# Patient Record
Sex: MALE | Race: WHITE | Employment: OTHER | URBAN - METROPOLITAN AREA
[De-identification: names, ages, dates, MRNs, and addresses within clinical notes are randomized per-mention and may not be internally consistent; named-entity substitution may affect disease eponyms.]

---

## 2023-02-07 DIAGNOSIS — C79.89 SQUAMOUS CELL CARCINOMA METASTATIC TO HEAD AND NECK WITH UNKNOWN PRIMARY SITE (HCC): Primary | ICD-10-CM

## 2023-02-07 DIAGNOSIS — C80.1 SQUAMOUS CELL CARCINOMA METASTATIC TO HEAD AND NECK WITH UNKNOWN PRIMARY SITE (HCC): Primary | ICD-10-CM

## 2023-02-14 ENCOUNTER — HOSPITAL ENCOUNTER (OUTPATIENT)
Dept: PET IMAGING | Age: 68
Discharge: HOME | End: 2023-02-17
Payer: MEDICARE

## 2023-02-14 DIAGNOSIS — C79.89 SQUAMOUS CELL CARCINOMA METASTATIC TO HEAD AND NECK WITH UNKNOWN PRIMARY SITE (HCC): ICD-10-CM

## 2023-02-14 DIAGNOSIS — C80.1 SQUAMOUS CELL CARCINOMA METASTATIC TO HEAD AND NECK WITH UNKNOWN PRIMARY SITE (HCC): ICD-10-CM

## 2023-02-14 LAB
GLUCOSE BLD STRIP.AUTO-MCNC: 105 MG/DL (ref 65–100)
SERVICE CMNT-IMP: ABNORMAL

## 2023-02-14 PROCEDURE — A9552 F18 FDG: HCPCS | Performed by: PHYSICIAN ASSISTANT

## 2023-02-14 PROCEDURE — 78815 PET IMAGE W/CT SKULL-THIGH: CPT

## 2023-02-14 PROCEDURE — 82962 GLUCOSE BLOOD TEST: CPT

## 2023-02-14 PROCEDURE — 2580000003 HC RX 258: Performed by: PHYSICIAN ASSISTANT

## 2023-02-14 PROCEDURE — 3430000000 HC RX DIAGNOSTIC RADIOPHARMACEUTICAL: Performed by: PHYSICIAN ASSISTANT

## 2023-02-14 PROCEDURE — 6360000004 HC RX CONTRAST MEDICATION: Performed by: PHYSICIAN ASSISTANT

## 2023-02-14 RX ORDER — FLUDEOXYGLUCOSE F 18 200 MCI/ML
11.83 INJECTION, SOLUTION INTRAVENOUS
Status: COMPLETED | OUTPATIENT
Start: 2023-02-14 | End: 2023-02-14

## 2023-02-14 RX ORDER — CHLORAL HYDRATE 500 MG
CAPSULE ORAL 2 TIMES DAILY
COMMUNITY

## 2023-02-14 RX ORDER — ATORVASTATIN CALCIUM 10 MG/1
10 TABLET, FILM COATED ORAL NIGHTLY
COMMUNITY

## 2023-02-14 RX ORDER — SODIUM CHLORIDE 0.9 % (FLUSH) 0.9 %
10 SYRINGE (ML) INJECTION ONCE AS NEEDED
Status: COMPLETED | OUTPATIENT
Start: 2023-02-14 | End: 2023-02-14

## 2023-02-14 RX ADMIN — FLUDEOXYGLUCOSE F 18 11.83 MILLICURIE: 200 INJECTION, SOLUTION INTRAVENOUS at 11:16

## 2023-02-14 RX ADMIN — DIATRIZOATE MEGLUMINE AND DIATRIZOATE SODIUM 10 ML: 660; 100 LIQUID ORAL; RECTAL at 11:16

## 2023-02-14 RX ADMIN — SODIUM CHLORIDE, PRESERVATIVE FREE 10 ML: 5 INJECTION INTRAVENOUS at 11:16

## 2023-02-14 NOTE — PRE-PROCEDURE INSTRUCTIONS
Patient verified name and . Order for consent was not found in EHR ; patient verifies procedure. Type III surgery, phone assessment complete. Orders were not received. Labs per surgeon: none received. Labs per anesthesia protocol: CBC, BMP, EKG done as outpatient, Type and Screen DOS. Patient stated he would come in to do outpatient labs on 2/15. Patient given address [address], [address] and hours of operation. Patient verbalized understanding. Attempted to obtain EKG from primary care doctor done 2022 and unable to talk with anyone in office, directed to message line. Chart  done. Patient answered medical/surgical history questions at their best of ability. All prior to admission medications documented in [facility]. Patient instructed to take the following medications the day of surgery according to anesthesia guidelines with a small sip of water: none On the day before surgery please take Acetaminophen 1000mg in the morning and then again before bed. You may substitute for Tylenol 650 mg. Hold all vitamins 7 days prior to surgery and NSAIDS 5 days prior to surgery. Prescription meds to hold:Hydrochlorothiazide, Potassium Citrate. Patient stopped Aspirin @2023  Patient instructed on the following:    > Arrive at [facility], time of arrival to be called the day before by 1700  > NPO after midnight, unless otherwise indicated, including gum, mints, and ice chips  > Responsible adult must drive patient to the hospital, stay during surgery, and patient will need supervision 24 hours after anesthesia  > Use antibacterial soap in shower the night before surgery and on the morning of surgery  > All piercings must be removed prior to arrival.    > Leave all valuables (money and jewelry) at home but bring insurance card and ID on DOS.   > You may be required to pay a deductible or co-pay on the day of your procedure.  You can pre-pay by calling [phone] if your surgery is at the [address] or [phone] if your surgery is at the [facility]. > Do not wear make-up, nail polish, lotions, cologne, perfumes, powders, or oil on skin. Artificial nails are not permitted.

## 2023-02-15 ENCOUNTER — HOSPITAL ENCOUNTER (OUTPATIENT)
Dept: PREADMISSION TESTING | Age: 68
Discharge: HOME | End: 2023-02-18
Payer: MEDICARE

## 2023-02-15 DIAGNOSIS — Z01.818 PREOP TESTING: ICD-10-CM

## 2023-02-15 LAB
ANION GAP SERPL CALC-SCNC: 3 MMOL/L (ref 2–11)
BUN SERPL-MCNC: 16 MG/DL (ref 8–23)
CALCIUM SERPL-MCNC: 9.2 MG/DL (ref 8.3–10.4)
CHLORIDE SERPL-SCNC: 110 MMOL/L (ref 101–110)
CO2 SERPL-SCNC: 29 MMOL/L (ref 21–32)
CREAT SERPL-MCNC: 1 MG/DL (ref 0.8–1.5)
EKG ATRIAL RATE: 54 BPM
EKG DIAGNOSIS: NORMAL
EKG P AXIS: 14 DEGREES
EKG P-R INTERVAL: 170 MS
EKG Q-T INTERVAL: 414 MS
EKG QRS DURATION: 92 MS
EKG QTC CALCULATION (BAZETT): 392 MS
EKG R AXIS: -31 DEGREES
EKG T AXIS: -8 DEGREES
EKG VENTRICULAR RATE: 54 BPM
ERYTHROCYTE [DISTWIDTH] IN BLOOD BY AUTOMATED COUNT: 14.1 % (ref 11.9–14.6)
GLUCOSE SERPL-MCNC: 101 MG/DL (ref 65–100)
HCT VFR BLD AUTO: 44.2 % (ref 41.1–50.3)
HGB BLD-MCNC: 14.5 G/DL (ref 13.6–17.2)
MCH RBC QN AUTO: 28.9 PG (ref 26.1–32.9)
MCHC RBC AUTO-ENTMCNC: 32.8 G/DL (ref 31.4–35)
MCV RBC AUTO: 88.2 FL (ref 82–102)
NRBC # BLD: 0 K/UL (ref 0–0.2)
PLATELET # BLD AUTO: 197 K/UL (ref 150–450)
PMV BLD AUTO: 9.6 FL (ref 9.4–12.3)
POTASSIUM SERPL-SCNC: 3.8 MMOL/L (ref 3.5–5.1)
RBC # BLD AUTO: 5.01 M/UL (ref 4.23–5.6)
SODIUM SERPL-SCNC: 142 MMOL/L (ref 133–143)
WBC # BLD AUTO: 6.9 K/UL (ref 4.3–11.1)

## 2023-02-15 PROCEDURE — 93005 ELECTROCARDIOGRAM TRACING: CPT | Performed by: ANESTHESIOLOGY

## 2023-02-15 PROCEDURE — 80048 BASIC METABOLIC PNL TOTAL CA: CPT

## 2023-02-15 PROCEDURE — 85027 COMPLETE CBC AUTOMATED: CPT

## 2023-02-16 ENCOUNTER — ANESTHESIA EVENT (OUTPATIENT)
Dept: SURGERY | Age: 68
DRG: 142 | End: 2023-02-16
Payer: MEDICARE

## 2023-02-17 NOTE — H&P
2023      Re: Camilo Gusman  :  1955  Age: 67 years  Gender: Male          Thank you for the opportunity of seeing your patient, Camilo Gusman, today.  Following is a summary of today's visit and my recommendation(s).      This 67 year old male presents for left neck adenopathy.    History of Present Illness  1.  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    Prior investigations have included a CT neck with contrast completed on 2023 which demonstrated a worrisome nearly 3 cm cystic structure deep to the left parotid consistent with a necrotic lymph node.  He underwent a fine-needle aspirate biopsy with Dr. Brock of [facility] 2023 which was nondiagnostic.  I repeated the biopsy on 23 with demonstration of p16- SCC.    He has a history of nonmelanoma cutaneous malignancy.  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    He still has his tonsils.  He notes no dysphagia or throat pain.  He does have a constant dull left-sided headache.  He is a lifelong nonsmoker.    He underwent a PET-CT on 2023. That study demonstrated the known left neck carcinoma as well as possibly some subtle asymmetry between the left and right tonsil.      Physical Exam    Constitutional: The patient is oriented to person, place, and time. The patient appears well-developed and well-nourished.   Head: Normocephalic and atraumatic.   Right Ear: External ear normal.   Left Ear: External ear normal.   Nose: Nose normal. No sinus tenderness or nasal deformity.   Mouth/Throat:  No obvious oral lesions.  No obvious oropharyngeal lesions.  Eyes: Conjunctivae are normal.   Neck:  Bulky left level IIb process.  Nontender.  Pulmonary/Chest: Effort normal. No stridor. No respiratory distress.   Skin: Skin is warm and dry.    PROCEDURE DETAILS:  Transnasal Fiberoptic  Laryngoscopy  Diagnosis:  Squamous cell carcinoma metastatic to left neck, unknown primary  Procedure:  Timeout performed and informed consent was obtained. Topical anesthesia in the form of oxymetazoline and lidocaine was administered. Flexible fiberoptic laryngoscope was passed through the nasal cavity and in to the nasopharynx. Good views were obtained of the nasopharynx, posterior oropharynx, hypopharynx and larynx. Scope was then removed atraumatically. Findings:  No overtly suspicious lesions of the visualized portions of the aerodigestive tract. Vocal fold mobility was within normal limits. Complications: None. 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 Our surgical plan for next week is panendoscopy with biopsies of the tongue base, tonsillectomy, left neck dissection. His spinal accessory nerve will certainly be at risk given the bulky nature of this lesion and its location. I remain suspicious for cutaneous primary given his clinical history. Additional testing will be carried out on his specimen which may give us some additional clues. Risks of neck dissection include bleeding, infection, risks of anesthesia, need for additional procedures, chyle leak, marginal mandibular injury with permanent or transient symptoms, spinal accessory injury with permanent or transient symptoms, hypoglossal nerve injury with permanent or transient symptoms as well as poor cosmesis. Risks of panendoscopy include bleeding, infection, risks of anesthesia, need for additional procedures. I also discussed the potential for damage to lips/teeth/gums, esophageal perforation or injury and inability to obtain definitive diagnosis if biopsies are performed.     This visit met a HIGH level of medical decision making due to 1. new acute illness with threat to life and 2. discussion held regarding surgery with patient/procedure risk factors. Thank you for allowing us to participate with this patient's care. Please feel free to contact me with any questions. Sincerely,        Luis Hines.  Lorenz Gaucher MD, FACS 02/17/2023 05:50 PM    Document generated by:  Burke Shaw  02/17/2023

## 2023-02-20 ENCOUNTER — ANESTHESIA (OUTPATIENT)
Dept: SURGERY | Age: 68
DRG: 142 | End: 2023-02-20
Payer: MEDICARE

## 2023-02-20 ENCOUNTER — HOSPITAL ENCOUNTER (INPATIENT)
Age: 68
LOS: 2 days | Discharge: HOME | DRG: 142 | End: 2023-02-22
Attending: OTOLARYNGOLOGY | Admitting: OTOLARYNGOLOGY
Payer: MEDICARE

## 2023-02-20 DIAGNOSIS — Z01.818 PREOP TESTING: Primary | ICD-10-CM

## 2023-02-20 DIAGNOSIS — R51.9 NONINTRACTABLE HEADACHE, UNSPECIFIED CHRONICITY PATTERN, UNSPECIFIED HEADACHE TYPE: Primary | ICD-10-CM

## 2023-02-20 DIAGNOSIS — C76.0 HEAD AND NECK CANCER (HCC): ICD-10-CM

## 2023-02-20 DIAGNOSIS — C79.89 METASTATIC SQUAMOUS CELL CARCINOMA TO HEAD AND NECK (HCC): ICD-10-CM

## 2023-02-20 LAB
ABO + RH BLD: NORMAL
BLOOD GROUP ANTIBODIES SERPL: NORMAL
SPECIMEN EXP DATE BLD: NORMAL

## 2023-02-20 PROCEDURE — 3700000001 HC ADD 15 MINUTES (ANESTHESIA): Performed by: OTOLARYNGOLOGY

## 2023-02-20 PROCEDURE — 3700000000 HC ANESTHESIA ATTENDED CARE: Performed by: OTOLARYNGOLOGY

## 2023-02-20 PROCEDURE — 0CBM8ZX EXCISION OF PHARYNX, VIA NATURAL OR ARTIFICIAL OPENING ENDOSCOPIC, DIAGNOSTIC: ICD-10-PCS | Performed by: OTOLARYNGOLOGY

## 2023-02-20 PROCEDURE — 6360000002 HC RX W HCPCS: Performed by: ANESTHESIOLOGY

## 2023-02-20 PROCEDURE — 2500000003 HC RX 250 WO HCPCS: Performed by: NURSE ANESTHETIST, CERTIFIED REGISTERED

## 2023-02-20 PROCEDURE — 2709999900 HC NON-CHARGEABLE SUPPLY: Performed by: OTOLARYNGOLOGY

## 2023-02-20 PROCEDURE — 3600000004 HC SURGERY LEVEL 4 BASE: Performed by: OTOLARYNGOLOGY

## 2023-02-20 PROCEDURE — 6360000002 HC RX W HCPCS: Performed by: PHYSICIAN ASSISTANT

## 2023-02-20 PROCEDURE — 88304 TISSUE EXAM BY PATHOLOGIST: CPT

## 2023-02-20 PROCEDURE — 3600000014 HC SURGERY LEVEL 4 ADDTL 15MIN: Performed by: OTOLARYNGOLOGY

## 2023-02-20 PROCEDURE — 1100000000 HC RM PRIVATE

## 2023-02-20 PROCEDURE — 6360000002 HC RX W HCPCS: Performed by: NURSE ANESTHETIST, CERTIFIED REGISTERED

## 2023-02-20 PROCEDURE — 86901 BLOOD TYPING SEROLOGIC RH(D): CPT

## 2023-02-20 PROCEDURE — 6370000000 HC RX 637 (ALT 250 FOR IP): Performed by: PHYSICIAN ASSISTANT

## 2023-02-20 PROCEDURE — 2500000003 HC RX 250 WO HCPCS: Performed by: OTOLARYNGOLOGY

## 2023-02-20 PROCEDURE — 2720000010 HC SURG SUPPLY STERILE: Performed by: OTOLARYNGOLOGY

## 2023-02-20 PROCEDURE — 7100000001 HC PACU RECOVERY - ADDTL 15 MIN: Performed by: OTOLARYNGOLOGY

## 2023-02-20 PROCEDURE — 88305 TISSUE EXAM BY PATHOLOGIST: CPT

## 2023-02-20 PROCEDURE — 6360000002 HC RX W HCPCS: Performed by: OTOLARYNGOLOGY

## 2023-02-20 PROCEDURE — 88307 TISSUE EXAM BY PATHOLOGIST: CPT

## 2023-02-20 PROCEDURE — 7100000000 HC PACU RECOVERY - FIRST 15 MIN: Performed by: OTOLARYNGOLOGY

## 2023-02-20 PROCEDURE — 07T20ZZ RESECTION OF LEFT NECK LYMPHATIC, OPEN APPROACH: ICD-10-PCS | Performed by: OTOLARYNGOLOGY

## 2023-02-20 PROCEDURE — 2580000003 HC RX 258: Performed by: PHYSICIAN ASSISTANT

## 2023-02-20 PROCEDURE — 2580000003 HC RX 258: Performed by: ANESTHESIOLOGY

## 2023-02-20 PROCEDURE — 6370000000 HC RX 637 (ALT 250 FOR IP): Performed by: ANESTHESIOLOGY

## 2023-02-20 PROCEDURE — 0CTPXZZ RESECTION OF TONSILS, EXTERNAL APPROACH: ICD-10-PCS | Performed by: OTOLARYNGOLOGY

## 2023-02-20 RX ORDER — SODIUM CHLORIDE 0.9 % (FLUSH) 0.9 %
5-40 SYRINGE (ML) INJECTION EVERY 12 HOURS SCHEDULED
Status: DISCONTINUED | OUTPATIENT
Start: 2023-02-20 | End: 2023-02-20 | Stop reason: HOSPADM

## 2023-02-20 RX ORDER — IPRATROPIUM BROMIDE AND ALBUTEROL SULFATE 2.5; .5 MG/3ML; MG/3ML
1 SOLUTION RESPIRATORY (INHALATION)
Status: DISCONTINUED | OUTPATIENT
Start: 2023-02-20 | End: 2023-02-20 | Stop reason: HOSPADM

## 2023-02-20 RX ORDER — GABAPENTIN 100 MG/1
100 CAPSULE ORAL 3 TIMES DAILY
Status: DISCONTINUED | OUTPATIENT
Start: 2023-02-20 | End: 2023-02-22 | Stop reason: HOSPADM

## 2023-02-20 RX ORDER — ATORVASTATIN CALCIUM 10 MG/1
10 TABLET, FILM COATED ORAL NIGHTLY
Status: DISCONTINUED | OUTPATIENT
Start: 2023-02-20 | End: 2023-02-22 | Stop reason: HOSPADM

## 2023-02-20 RX ORDER — CELECOXIB 100 MG/1
100 CAPSULE ORAL 2 TIMES DAILY
Status: DISCONTINUED | OUTPATIENT
Start: 2023-02-21 | End: 2023-02-22 | Stop reason: HOSPADM

## 2023-02-20 RX ORDER — HYDROCHLOROTHIAZIDE 25 MG/1
25 TABLET ORAL DAILY
Status: DISCONTINUED | OUTPATIENT
Start: 2023-02-20 | End: 2023-02-22 | Stop reason: HOSPADM

## 2023-02-20 RX ORDER — HYDROMORPHONE HCL 110MG/55ML
PATIENT CONTROLLED ANALGESIA SYRINGE INTRAVENOUS PRN
Status: DISCONTINUED | OUTPATIENT
Start: 2023-02-20 | End: 2023-02-20 | Stop reason: SDUPTHER

## 2023-02-20 RX ORDER — ENOXAPARIN SODIUM 100 MG/ML
40 INJECTION SUBCUTANEOUS DAILY
Status: DISCONTINUED | OUTPATIENT
Start: 2023-02-21 | End: 2023-02-22 | Stop reason: HOSPADM

## 2023-02-20 RX ORDER — MORPHINE SULFATE 2 MG/ML
2 INJECTION, SOLUTION INTRAMUSCULAR; INTRAVENOUS
Status: DISCONTINUED | OUTPATIENT
Start: 2023-02-20 | End: 2023-02-22 | Stop reason: HOSPADM

## 2023-02-20 RX ORDER — LIDOCAINE HYDROCHLORIDE 20 MG/ML
INJECTION, SOLUTION EPIDURAL; INFILTRATION; INTRACAUDAL; PERINEURAL PRN
Status: DISCONTINUED | OUTPATIENT
Start: 2023-02-20 | End: 2023-02-20 | Stop reason: SDUPTHER

## 2023-02-20 RX ORDER — ASPIRIN 81 MG/1
81 TABLET, CHEWABLE ORAL 2 TIMES DAILY
Status: DISCONTINUED | OUTPATIENT
Start: 2023-02-20 | End: 2023-02-22 | Stop reason: HOSPADM

## 2023-02-20 RX ORDER — POLYETHYLENE GLYCOL 3350 17 G/17G
17 POWDER, FOR SOLUTION ORAL DAILY PRN
Status: DISCONTINUED | OUTPATIENT
Start: 2023-02-20 | End: 2023-02-22 | Stop reason: HOSPADM

## 2023-02-20 RX ORDER — HYDRALAZINE HYDROCHLORIDE 25 MG/1
25 TABLET, FILM COATED ORAL EVERY 6 HOURS PRN
Status: DISCONTINUED | OUTPATIENT
Start: 2023-02-20 | End: 2023-02-22 | Stop reason: HOSPADM

## 2023-02-20 RX ORDER — SODIUM CHLORIDE 9 MG/ML
INJECTION, SOLUTION INTRAVENOUS PRN
Status: DISCONTINUED | OUTPATIENT
Start: 2023-02-20 | End: 2023-02-22 | Stop reason: HOSPADM

## 2023-02-20 RX ORDER — PROCHLORPERAZINE EDISYLATE 5 MG/ML
5 INJECTION INTRAMUSCULAR; INTRAVENOUS
Status: DISCONTINUED | OUTPATIENT
Start: 2023-02-20 | End: 2023-02-20 | Stop reason: HOSPADM

## 2023-02-20 RX ORDER — FENTANYL CITRATE 50 UG/ML
100 INJECTION, SOLUTION INTRAMUSCULAR; INTRAVENOUS
Status: DISCONTINUED | OUTPATIENT
Start: 2023-02-20 | End: 2023-02-20 | Stop reason: HOSPADM

## 2023-02-20 RX ORDER — DIPHENHYDRAMINE HYDROCHLORIDE 50 MG/ML
25 INJECTION INTRAMUSCULAR; INTRAVENOUS EVERY 6 HOURS PRN
Status: DISCONTINUED | OUTPATIENT
Start: 2023-02-20 | End: 2023-02-22 | Stop reason: HOSPADM

## 2023-02-20 RX ORDER — FENTANYL CITRATE 50 UG/ML
INJECTION, SOLUTION INTRAMUSCULAR; INTRAVENOUS PRN
Status: DISCONTINUED | OUTPATIENT
Start: 2023-02-20 | End: 2023-02-20 | Stop reason: SDUPTHER

## 2023-02-20 RX ORDER — ONDANSETRON 2 MG/ML
4 INJECTION INTRAMUSCULAR; INTRAVENOUS EVERY 6 HOURS PRN
Status: DISCONTINUED | OUTPATIENT
Start: 2023-02-20 | End: 2023-02-22 | Stop reason: HOSPADM

## 2023-02-20 RX ORDER — METHYLENE BLUE 10 MG/ML
INJECTION INTRAVENOUS PRN
Status: DISCONTINUED | OUTPATIENT
Start: 2023-02-20 | End: 2023-02-20 | Stop reason: HOSPADM

## 2023-02-20 RX ORDER — SODIUM CHLORIDE 0.9 % (FLUSH) 0.9 %
5-40 SYRINGE (ML) INJECTION EVERY 12 HOURS SCHEDULED
Status: DISCONTINUED | OUTPATIENT
Start: 2023-02-20 | End: 2023-02-22 | Stop reason: HOSPADM

## 2023-02-20 RX ORDER — HALOPERIDOL 5 MG/ML
1 INJECTION INTRAMUSCULAR
Status: DISCONTINUED | OUTPATIENT
Start: 2023-02-20 | End: 2023-02-20 | Stop reason: HOSPADM

## 2023-02-20 RX ORDER — MIDAZOLAM HYDROCHLORIDE 2 MG/2ML
2 INJECTION, SOLUTION INTRAMUSCULAR; INTRAVENOUS
Status: COMPLETED | OUTPATIENT
Start: 2023-02-20 | End: 2023-02-20

## 2023-02-20 RX ORDER — LIDOCAINE HYDROCHLORIDE 10 MG/ML
1 INJECTION, SOLUTION INFILTRATION; PERINEURAL
Status: DISCONTINUED | OUTPATIENT
Start: 2023-02-20 | End: 2023-02-20 | Stop reason: HOSPADM

## 2023-02-20 RX ORDER — DEXAMETHASONE SODIUM PHOSPHATE 4 MG/ML
4 INJECTION, SOLUTION INTRA-ARTICULAR; INTRALESIONAL; INTRAMUSCULAR; INTRAVENOUS; SOFT TISSUE ONCE
Status: COMPLETED | OUTPATIENT
Start: 2023-02-21 | End: 2023-02-21

## 2023-02-20 RX ORDER — OXYCODONE HYDROCHLORIDE 5 MG/1
5 TABLET ORAL
Status: DISCONTINUED | OUTPATIENT
Start: 2023-02-20 | End: 2023-02-20 | Stop reason: HOSPADM

## 2023-02-20 RX ORDER — ACETAMINOPHEN 500 MG
1000 TABLET ORAL ONCE
Status: COMPLETED | OUTPATIENT
Start: 2023-02-20 | End: 2023-02-20

## 2023-02-20 RX ORDER — PROPOFOL 10 MG/ML
INJECTION, EMULSION INTRAVENOUS PRN
Status: DISCONTINUED | OUTPATIENT
Start: 2023-02-20 | End: 2023-02-20 | Stop reason: SDUPTHER

## 2023-02-20 RX ORDER — POTASSIUM CHLORIDE 750 MG/1
10 TABLET, EXTENDED RELEASE ORAL 2 TIMES DAILY
Status: DISCONTINUED | OUTPATIENT
Start: 2023-02-20 | End: 2023-02-22 | Stop reason: HOSPADM

## 2023-02-20 RX ORDER — SODIUM CHLORIDE, SODIUM LACTATE, POTASSIUM CHLORIDE, CALCIUM CHLORIDE 600; 310; 30; 20 MG/100ML; MG/100ML; MG/100ML; MG/100ML
INJECTION, SOLUTION INTRAVENOUS CONTINUOUS
Status: DISCONTINUED | OUTPATIENT
Start: 2023-02-20 | End: 2023-02-20 | Stop reason: HOSPADM

## 2023-02-20 RX ORDER — HYDROMORPHONE HCL 110MG/55ML
0.5 PATIENT CONTROLLED ANALGESIA SYRINGE INTRAVENOUS EVERY 5 MIN PRN
Status: DISCONTINUED | OUTPATIENT
Start: 2023-02-20 | End: 2023-02-20 | Stop reason: HOSPADM

## 2023-02-20 RX ORDER — FAMOTIDINE 20 MG/1
20 TABLET, FILM COATED ORAL 2 TIMES DAILY
Status: DISCONTINUED | OUTPATIENT
Start: 2023-02-20 | End: 2023-02-22 | Stop reason: HOSPADM

## 2023-02-20 RX ORDER — SUCCINYLCHOLINE/SOD CL,ISO/PF 200MG/10ML
SYRINGE (ML) INTRAVENOUS PRN
Status: DISCONTINUED | OUTPATIENT
Start: 2023-02-20 | End: 2023-02-20 | Stop reason: SDUPTHER

## 2023-02-20 RX ORDER — CEFAZOLIN SODIUM 1 G/3ML
INJECTION, POWDER, FOR SOLUTION INTRAMUSCULAR; INTRAVENOUS PRN
Status: DISCONTINUED | OUTPATIENT
Start: 2023-02-20 | End: 2023-02-20 | Stop reason: SDUPTHER

## 2023-02-20 RX ORDER — SODIUM CHLORIDE 0.9 % (FLUSH) 0.9 %
5-40 SYRINGE (ML) INJECTION PRN
Status: DISCONTINUED | OUTPATIENT
Start: 2023-02-20 | End: 2023-02-22 | Stop reason: HOSPADM

## 2023-02-20 RX ORDER — NITROGLYCERIN 0.4 MG/1
0.4 TABLET SUBLINGUAL EVERY 5 MIN PRN
Status: DISCONTINUED | OUTPATIENT
Start: 2023-02-20 | End: 2023-02-22 | Stop reason: HOSPADM

## 2023-02-20 RX ORDER — ACETAMINOPHEN 160 MG/5ML
650 SUSPENSION, ORAL (FINAL DOSE FORM) ORAL EVERY 6 HOURS
Status: DISCONTINUED | OUTPATIENT
Start: 2023-02-20 | End: 2023-02-22 | Stop reason: HOSPADM

## 2023-02-20 RX ORDER — ONDANSETRON 2 MG/ML
INJECTION INTRAMUSCULAR; INTRAVENOUS PRN
Status: DISCONTINUED | OUTPATIENT
Start: 2023-02-20 | End: 2023-02-20 | Stop reason: SDUPTHER

## 2023-02-20 RX ORDER — ZOLPIDEM TARTRATE 5 MG/1
5 TABLET ORAL NIGHTLY PRN
Status: DISCONTINUED | OUTPATIENT
Start: 2023-02-20 | End: 2023-02-22 | Stop reason: HOSPADM

## 2023-02-20 RX ORDER — SODIUM CHLORIDE 0.9 % (FLUSH) 0.9 %
5-40 SYRINGE (ML) INJECTION PRN
Status: DISCONTINUED | OUTPATIENT
Start: 2023-02-20 | End: 2023-02-20 | Stop reason: HOSPADM

## 2023-02-20 RX ORDER — SODIUM CHLORIDE 9 MG/ML
INJECTION, SOLUTION INTRAVENOUS PRN
Status: DISCONTINUED | OUTPATIENT
Start: 2023-02-20 | End: 2023-02-20 | Stop reason: HOSPADM

## 2023-02-20 RX ORDER — ONDANSETRON 8 MG/1
4 TABLET, ORALLY DISINTEGRATING ORAL EVERY 8 HOURS PRN
Status: DISCONTINUED | OUTPATIENT
Start: 2023-02-20 | End: 2023-02-22 | Stop reason: HOSPADM

## 2023-02-20 RX ORDER — OXYCODONE HYDROCHLORIDE 5 MG/1
5 TABLET ORAL EVERY 4 HOURS PRN
Status: DISCONTINUED | OUTPATIENT
Start: 2023-02-20 | End: 2023-02-22 | Stop reason: HOSPADM

## 2023-02-20 RX ORDER — ESMOLOL HYDROCHLORIDE 10 MG/ML
INJECTION INTRAVENOUS PRN
Status: DISCONTINUED | OUTPATIENT
Start: 2023-02-20 | End: 2023-02-20 | Stop reason: SDUPTHER

## 2023-02-20 RX ORDER — DEXAMETHASONE SODIUM PHOSPHATE 10 MG/ML
INJECTION INTRAMUSCULAR; INTRAVENOUS PRN
Status: DISCONTINUED | OUTPATIENT
Start: 2023-02-20 | End: 2023-02-20 | Stop reason: SDUPTHER

## 2023-02-20 RX ADMIN — CEFAZOLIN SODIUM 2 G: 1 INJECTION, POWDER, FOR SOLUTION INTRAMUSCULAR; INTRAVENOUS at 13:17

## 2023-02-20 RX ADMIN — CEFAZOLIN SODIUM 2 G: 1 INJECTION, POWDER, FOR SOLUTION INTRAMUSCULAR; INTRAVENOUS at 17:10

## 2023-02-20 RX ADMIN — MIDAZOLAM 2 MG: 1 INJECTION INTRAMUSCULAR; INTRAVENOUS at 12:42

## 2023-02-20 RX ADMIN — OXYCODONE 5 MG: 5 TABLET ORAL at 21:30

## 2023-02-20 RX ADMIN — DEXAMETHASONE SODIUM PHOSPHATE 10 MG: 10 INJECTION INTRAMUSCULAR; INTRAVENOUS at 16:29

## 2023-02-20 RX ADMIN — ONDANSETRON 4 MG: 2 INJECTION INTRAMUSCULAR; INTRAVENOUS at 16:29

## 2023-02-20 RX ADMIN — SODIUM CHLORIDE, PRESERVATIVE FREE 10 ML: 5 INJECTION INTRAVENOUS at 19:52

## 2023-02-20 RX ADMIN — ESMOLOL HYDROCHLORIDE 20 MG: 10 INJECTION INTRAVENOUS at 17:29

## 2023-02-20 RX ADMIN — ACETAMINOPHEN 1000 MG: 500 TABLET, FILM COATED ORAL at 12:41

## 2023-02-20 RX ADMIN — Medication 160 MG: at 12:59

## 2023-02-20 RX ADMIN — FAMOTIDINE 20 MG: 20 TABLET, FILM COATED ORAL at 20:02

## 2023-02-20 RX ADMIN — HYDROMORPHONE HYDROCHLORIDE 0.2 MG: 2 INJECTION INTRAMUSCULAR; INTRAVENOUS; SUBCUTANEOUS at 17:04

## 2023-02-20 RX ADMIN — ACETAMINOPHEN 650 MG: 325 SUSPENSION ORAL at 20:02

## 2023-02-20 RX ADMIN — HYDROMORPHONE HYDROCHLORIDE 0.5 MG: 2 INJECTION, SOLUTION INTRAMUSCULAR; INTRAVENOUS; SUBCUTANEOUS at 18:11

## 2023-02-20 RX ADMIN — FENTANYL CITRATE 50 MCG: 50 INJECTION, SOLUTION INTRAMUSCULAR; INTRAVENOUS at 14:31

## 2023-02-20 RX ADMIN — HYDROMORPHONE HYDROCHLORIDE 0.6 MG: 2 INJECTION INTRAMUSCULAR; INTRAVENOUS; SUBCUTANEOUS at 15:37

## 2023-02-20 RX ADMIN — ASPIRIN 81 MG 81 MG: 81 TABLET ORAL at 20:02

## 2023-02-20 RX ADMIN — FENTANYL CITRATE 100 MCG: 50 INJECTION, SOLUTION INTRAMUSCULAR; INTRAVENOUS at 12:58

## 2023-02-20 RX ADMIN — PROPOFOL 170 MG: 10 INJECTION, EMULSION INTRAVENOUS at 12:58

## 2023-02-20 RX ADMIN — PROPOFOL 20 MG: 10 INJECTION, EMULSION INTRAVENOUS at 16:59

## 2023-02-20 RX ADMIN — FENTANYL CITRATE 50 MCG: 50 INJECTION, SOLUTION INTRAMUSCULAR; INTRAVENOUS at 13:31

## 2023-02-20 RX ADMIN — POTASSIUM CHLORIDE 10 MEQ: 750 TABLET, EXTENDED RELEASE ORAL at 20:02

## 2023-02-20 RX ADMIN — PROPOFOL 20 MG: 10 INJECTION, EMULSION INTRAVENOUS at 17:03

## 2023-02-20 RX ADMIN — HYDROMORPHONE HYDROCHLORIDE 0.5 MG: 2 INJECTION, SOLUTION INTRAMUSCULAR; INTRAVENOUS; SUBCUTANEOUS at 18:01

## 2023-02-20 RX ADMIN — SODIUM CHLORIDE, SODIUM LACTATE, POTASSIUM CHLORIDE, AND CALCIUM CHLORIDE: 600; 310; 30; 20 INJECTION, SOLUTION INTRAVENOUS at 12:46

## 2023-02-20 RX ADMIN — MORPHINE SULFATE 2 MG: 2 INJECTION, SOLUTION INTRAMUSCULAR; INTRAVENOUS at 22:55

## 2023-02-20 RX ADMIN — GABAPENTIN 100 MG: 100 CAPSULE ORAL at 20:02

## 2023-02-20 RX ADMIN — PROPOFOL 30 MG: 10 INJECTION, EMULSION INTRAVENOUS at 17:12

## 2023-02-20 RX ADMIN — ZOLPIDEM TARTRATE 5 MG: 5 TABLET ORAL at 20:02

## 2023-02-20 RX ADMIN — LIDOCAINE HYDROCHLORIDE 80 MG: 20 INJECTION, SOLUTION EPIDURAL; INFILTRATION; INTRACAUDAL; PERINEURAL at 12:58

## 2023-02-20 RX ADMIN — CEFAZOLIN SODIUM 2000 MG: 100 INJECTION, POWDER, LYOPHILIZED, FOR SOLUTION INTRAVENOUS at 23:55

## 2023-02-20 RX ADMIN — HYDROMORPHONE HYDROCHLORIDE 0.5 MG: 2 INJECTION, SOLUTION INTRAMUSCULAR; INTRAVENOUS; SUBCUTANEOUS at 18:06

## 2023-02-20 RX ADMIN — ATORVASTATIN CALCIUM 10 MG: 10 TABLET, FILM COATED ORAL at 20:02

## 2023-02-20 RX ADMIN — SODIUM CHLORIDE, SODIUM LACTATE, POTASSIUM CHLORIDE, AND CALCIUM CHLORIDE: 600; 310; 30; 20 INJECTION, SOLUTION INTRAVENOUS at 14:32

## 2023-02-20 ASSESSMENT — PAIN SCALES - GENERAL
PAINLEVEL_OUTOF10: 2
PAINLEVEL_OUTOF10: 7
PAINLEVEL_OUTOF10: 6
PAINLEVEL_OUTOF10: 7
PAINLEVEL_OUTOF10: 5

## 2023-02-20 ASSESSMENT — PAIN DESCRIPTION - LOCATION
LOCATION: NECK
LOCATION: THROAT
LOCATION: NECK

## 2023-02-20 ASSESSMENT — PAIN - FUNCTIONAL ASSESSMENT
PAIN_FUNCTIONAL_ASSESSMENT: ACTIVITIES ARE NOT PREVENTED
PAIN_FUNCTIONAL_ASSESSMENT: 0-10
PAIN_FUNCTIONAL_ASSESSMENT: ACTIVITIES ARE NOT PREVENTED

## 2023-02-20 ASSESSMENT — PAIN DESCRIPTION - DESCRIPTORS
DESCRIPTORS: SORE
DESCRIPTORS: ACHING

## 2023-02-20 ASSESSMENT — PAIN DESCRIPTION - ORIENTATION
ORIENTATION: LEFT
ORIENTATION: LEFT

## 2023-02-20 NOTE — PERIOP NOTE
TRANSFER - OUT REPORT:    Verbal report given to JAIRO Pagan on [place]  being transferred to [address] [place] [address] for routine progression of patient care       Report consisted of patient's Situation, Background, Assessment and   Recommendations(SBAR). Information from the following report(s) Nurse Handoff Report, Adult Overview, MAR, Med Rec Status, and Cardiac Rhythm NSR  was reviewed with the receiving nurse. [place] Assessment: No data recorded  Lines:   Peripheral IV 02/20/23 Left;Posterior Forearm (Active)   Site Assessment Clean, dry & intact 02/20/23 1817   Phlebitis Assessment No symptoms 02/20/23 1817   Infiltration Assessment 0 02/20/23 1817   Dressing Status Clean, dry & intact 02/20/23 1817   Dressing Type Transparent 02/20/23 1817       Peripheral IV 02/20/23 Right Hand (Active)   Site Assessment Clean, dry & intact 02/20/23 1817   Phlebitis Assessment No symptoms 02/20/23 1817   Infiltration Assessment 0 02/20/23 1817   Dressing Status Clean, dry & intact 02/20/23 1817   Dressing Type Transparent 02/20/23 1817        Opportunity for questions and clarification was provided.       Patient transported with:  O2 @ 2lpm

## 2023-02-20 NOTE — OP NOTE
[facility]    OR First Assist Note      Admit Service: ENT Surgery     Name: Kai Pat     MRN: [id number]    : 1955    Date of Procedure: 2023         Pre-Operative Diagnosis:   Neck mass [R22.1]  Squamous cell carcinoma of neck [C44.42]       Post-operative Diagnosis:   Same    Procedure:   Procedure(s):  PANENDOSCOPY, TONSILLECTOMY, AND LEFT NECK DISSECTION    Surgeon:   Primary: Trini Lopez MD    Assistant:  Moy Madison PA-C    OR Staff:   Circulator: Chelsey Dos Santos RN  Relief Circulator: Bozena Dos Santos RN  Relief Scrub: Juanito Mayfield; Logan Farr  Scrub Person First: Andre Martins MA  Scrub Person Second: Jane Albarado        First Assistant Attestation:   My assistance was required as there were no qualified ENT surgery residents available. I assisted with identification and protection/preservation of important neurovascular structures during the procedure. I closed surgical incisions and applied dressings to surgical sites at the conclusion of the case.         BUZZ Salazar ENT [facility]

## 2023-02-20 NOTE — INTERVAL H&P NOTE
Update History & Physical    The patient's History and Physical of February 20, 2023 was reviewed with the patient and I examined the patient. There was no change. The surgical site was confirmed by the patient and me. OR today for panendoscopy, tonsillectomy, left neck dissection. Heart RRR, lungs CTA bilaterally    Plan: The risks, benefits, expected outcome, and alternative to the recommended procedure have been discussed with the patient. Patient understands and wants to proceed with the procedure.      Electronically signed by Valerio Gomez MD on 2/20/2023 at 11:08 AM

## 2023-02-20 NOTE — OP NOTE
Operative Note      Patient: Deborah Record  YOB: 1955  MRN: [id number]    Date of Procedure: 2/20/2023    Pre-Op Diagnosis: Neck mass [R22.1]  Squamous cell carcinoma of neck [C44.42]    Post-Op Diagnosis: Same       Procedure:  -Left radical neck dissection  -Direct laryngoscopy with biopsy, operating telescope  --Bilateral tonsillectomy      Surgeon(s):  Tyrell Reyes MD    Assistant:   Adonica Galeazzi, PA    Anesthesia: General    Estimated Blood Loss (mL): less than 50     Complications: None    Specimens:   ID Type Source Tests Collected by Time Destination   A : Left External Jugular Lymph Node  Tissue Neck SURGICAL PATHOLOGY Tyrell Reyes MD 2/20/2023 1421    B : Left Level Two A Contents  Tissue Neck SURGICAL PATHOLOGY Tyrell Reyes MD 2/20/2023 1439    C : Left Level Two B Contents stitch marks superior Tissue Neck SURGICAL PATHOLOGY Tyrell Reyes MD 2/20/2023 1440    D : Left Level Three Contents Tissue Neck SURGICAL PATHOLOGY Tyrell Reyes MD 2/20/2023 1440    E : Left Level Four Contents Tissue Neck SURGICAL PATHOLOGY Tyrell Reyes MD 2/20/2023 1440    F : Left Level Five Contents Tissue Neck SURGICAL PATHOLOGY Tyrell Reyes MD 2/20/2023 1440    G : left tonsil evaluate for malignancy stitch marks superior and methylene blue marks anterior Tissue Neck SURGICAL PATHOLOGY Tyrell Reyes MD 2/20/2023 1441    H : Left Tongue Base  Tissue Tongue SURGICAL PATHOLOGY Tyrell Reyes MD 2/20/2023 1442    I : Right Tongue Base  Tissue Tongue SURGICAL PATHOLOGY Tyrell Reyes MD 2/20/2023 1442    J : reexcision superior margin level 2 b methylene blue marks tumor side Tissue Tongue SURGICAL PATHOLOGY Tyrell Reyes MD 2/20/2023 1705    K : right tonsil evaluate for malignancy stitch marks superior and methylene blue marks anterior Tissue Neck SURGICAL PATHOLOGY Tyrell Reyes MD 2/20/2023 1709        Implants:  * No implants in log *      Drains:   Closed/Suction Drain Anterior; Left Neck Bulb (Active) Urinary Catheter 02/20/23 2 Way; Marie (Active)       Findings:   -Bulky elizabeth disease centered over left level 2b neck  -Left radical neck dissection levels II-V. Spinal accessory nerve was encased by tumor. Extended dissection of the nerve working distal to proximal.  -No overtly suspicious lesions of the visualized portions of the aerodigestive tract. Subtle contact bleeding at the left tongue base was specifically targeted. Detailed Description of Procedure: The patient was correctly identified then brought back to the operative theater and placed supine upon the operative table. Anesthesia was induced and the patient was intubated without issue. Timeout was performed. The patient was turned to the surgeon. I marked out a utility incision over the neck. Local anesthesia was infiltrated. The neck was prepped and draped in the usual fashion. I divided the skin and raised subplatysmal flaps superiorly and inferiorly. The external jugular vein was identified and resected. The anterior border of the sternocleidomastoid was exposed. The inferior edge of the submandibular gland was exposed and IIa contents reflected inferiorly. The spinal accessory nerve was identified low in the neck. I divided the sternocleidmastoid over the nerve to the level of the dominant elizabeth mass. Findings as above with nerve clinically encased by tumor. I then divided the sternocleidomastoid superiorly at the mastoid. Working from inferior to superior, I reflected the carotid and vagus nerve off the internal jugular vein. At the level of the dominant elizabeth mass, the vein was adherent and involved. I divided the vein low in the neck. Lower cervical rootlets were identified and spared. The contents of levels III and IV were brought up and out of the neck. The entire neck dissection specimen was carefully dissected off the carotid and vagus.   The internal jugular vein was controlled superiorly and final soft tissue cuts completed. The specimen was marked and passed off the field. A surgical drain was placed and the incision was closed in layers. I protected the eyes, maxillary alveolar ridge and/or teeth in the usual fashion. I introduced the Dedo with findings as above. Inspection and/or photodocumentation was performed with the [place] pipe telescope. Biopsy performed as described above. The laryngoscope was removed. The Tu-Ywa retractor was placed to expose the oropharynx. I turned my attention first to the left tonsil. I grasped the tonsil with a curved Allis and employed the monopolar cautery to divide the tonsil off the underlying constrictor. A procedure with identical steps was performed on the right. Hemostasis was obtained. Local anesthetic was injected to the tonsillar fossae. The physician assistant provided critical retraction and assistance with dissection neurovascular structures. No qualified residents are available to us on our service. The patient tolerated the procedure well and woke without issues.       Electronically signed by Denver Luria, MD on 2/20/2023 at 5:25 PM

## 2023-02-20 NOTE — ANESTHESIA PRE PROCEDURE
[facility] Hematology and Oncology: Office Visit Established Patient    Chief Complaint   Patient presents with    Follow-up      History of Present Illness:  Ms. Martell is a  64 y.o. female who presents today for  follow up regarding colon cancer. she was admitted on 1/22/22 with peritonitis, colon mass, obstruction and bowel perforation. PMhx: anxiety,  corneal dystrophy s/p transplants (bilat) on steroid drops, CAD, GERD, IBS, HLD. She p/w abd pain x2 days. CT AP c/w 2.3cm mass in mid-ascending colon and LAD with obstruction and perforation. S/p subsequent right extended colectomy with ileo-transverse staped anastomosis. Path c/w mod-poorly diff adenocarcinoma - [id number] (2/14 LN +). We were consulted for recs. Started FOLFOX - adjuvantly. She returns today for follow up and consideration of cycle 12 FOLFOX. She is noting more fatigue and neuropathy. We had a discussion re p/w tx or omit or delay vs delay and lower dose in great detail with daughter. Currently plst 71K - will hold tx this week. Plan to recheck labs Wed to see if approp. She also wishes to dc oxali from tx plan for the concern of worsening neuropathy. C/w B complex. Labs reviewed andK replacement recs given to pt/  Fatigue is ongoing, tolerable. Chronological Events:  1/22/22 admitted with abd pain/perforation, dx'ed w colon ca    2/9/22 HFU - reviewed path again and next steps in management    2/28/22 FU C1D1 FOLFOX    3/7/22 FU - toxicity check following C1. Oxaliplatin was dose reduced for first cycle to 80%. 3/14/22 FU C2 FOLFOX-continue with Oxali dose reduction. 3/28/22 Cycle 3 FOLFOX - oxaliplatin is dose reduced. Tolerating well. 4/11/22 pre C4 due to gen debility and plts at 68 - will delay tx by 1 week; replete fluids/lytes   4/18/22 pre C4 - held last week for TCP/weakness. Plts improved and feeling better, no further diarrhea. 5/2/22 pre C5 - held d/t low ANC. Cold sensitivity worse.  RX Remeron Department of Anesthesiology  Preprocedure Note       Name:  Phillip Orozco   Age:  69 y.o.  :  1955                                          MRN:  [id number]         Date:  2023      Surgeon: Keshav Lu):  Jose A Glynn MD    Procedure: Procedure(s):  LEFT NECK DISSECTION RADICAL FROZEN SECTION  POSS DIRECT LARYNGOSCOPY WITH BIOPSY  POSS ESOPHAGOSCOPY  POSS TONSILLECTOMY    Medications prior to admission:   Prior to Admission medications    Medication Sig Start Date End Date Taking? Authorizing Provider   atorvastatin (LIPITOR) 10 MG tablet Take 10 mg by mouth at bedtime   Yes Historical Provider, MD   Omega-3 Fatty Acids (FISH OIL) 1000 MG capsule Take by mouth 2 times daily   Yes Historical Provider, MD   Multiple Vitamin (MULTI-VITAMIN DAILY PO) Take by mouth daily   Yes Historical Provider, MD   atorvastatin (LIPITOR) 10 MG tablet [facility] the [facility]  - [facility]: Outside name: atorvastatin (LIPITOR) 10 mg tablet 10/18/14   Ar Automatic Reconciliation   latanoprost (XALATAN) 0.005 % ophthalmic solution [facility] the [facility]  - [facility]: Outside name: latanoprost (XALATAN) 0.005 % ophthalmic solution  Patient not taking: Reported on 2023   Ar Automatic Reconciliation   aspirin 81 MG chewable tablet Take 81 mg by mouth 2 times daily Stopped @ 2023    Ar Automatic Reconciliation   eszopiclone (LUNESTA) 2 MG TABS Take 2 mg by mouth as needed.  20   Ar Automatic Reconciliation   hydroCHLOROthiazide (HYDRODIURIL) 25 MG tablet Take 25 mg by mouth daily Kidney stones 11/12/15   Ar Automatic Reconciliation   nitroGLYCERIN (NITROSTAT) 0.4 MG SL tablet Place 0.4 mg under the tongue  Patient not taking: Reported on 2023   Ar Automatic Reconciliation   potassium citrate (UROCIT-K) 10 MEQ (1080 MG) extended release tablet Take 10 mEq by mouth in the morning and at bedtime Kidney stones 14   Ar Automatic Reconciliation       Current for appetite/sleep. 5/9/22 Here pre-cycle 5 after one week hold for neutropenia. Holding again for ANC. Next week, reduce dose again by 20% for cold paresthesias, neutropenia and overall tolerance. 5/31/20 pre chemo C6, doing well, improved neuropathy/cod sensitivity with dose reduction, -defer by 1 week  6/27/22 FU pre C7, p/w tx; will plan to drop dose of GCSF to 1/2 after d/w pharmacy. 7/12/22 FU pre C8, plt adequate at 76k. Agree to p/w treatment. 7/26/22 FU, Plt 56K-defer C9 by 1 week    8/18/22 FU C10, fluids in the interim   9/6/22 Here for cycle 11. Doing well overall - best she has felt. Mild elevation in ALT/AST/Alk phos - continue to monitor. 9/19/22 FU pre C12 - hold plts 71; d/c oxali - repeat labs pre tx Wed     Family History   Problem Relation Age of Onset    Heart Attack Father     Heart Disease Father         Arterosclerotic Cardiovascular Disease    Breast Cancer Maternal Aunt        Social History     Socioeconomic History    Marital status: Legally      Spouse name: Not on file    Number of children: Not on file    Years of education: Not on file    Highest education level: Not on file   Occupational History    Not on file   Tobacco Use    Smoking status: Never    Smokeless tobacco: Never   Substance and Sexual Activity    Alcohol use: No     Alcohol/week: 0.0 standard drinks    Drug use: Yes     Types: Prescription, OTC    Sexual activity: Not on file   Other Topics Concern    Not on file   Social History Narrative    Not on file     Social Determinants of Health     Financial Resource Strain: Not on file   Food Insecurity: Not on file   Transportation Needs: Not on file   Physical Activity: Not on file   Stress: Not on file   Social Connections: Not on file   Intimate Partner Violence: Not on file   Housing Stability: Not on file          Review of Systems   Constitutional:  Positive for fatigue (mild). Negative for appetite change, chills and fever.    HENT: medications:    Current Facility-Administered Medications   Medication Dose Route Frequency Provider Last Rate Last Admin    lidocaine 1 % injection 1 mL  1 mL IntraDERmal Once PRN Ronak Bernabe MD        acetaminophen (TYLENOL) tablet 1,000 mg  1,000 mg Oral Once Ronak Bernabe MD        fentaNYL (SUBLIMAZE) injection 100 mcg  100 mcg IntraVENous Once PRN Ronak Bernabe MD        lactated ringers IV soln infusion   IntraVENous Continuous Ronak Bernabe MD        sodium chloride flush 0.9 % injection 5-40 mL  5-40 mL IntraVENous 2 times per day Ronak Bernabe MD        sodium chloride flush 0.9 % injection 5-40 mL  5-40 mL IntraVENous PRN Ronak Bernabe MD        0.9 % sodium chloride infusion   IntraVENous PRN Ronak Bernabe MD        midazolam (VERSED) PF injection 2 mg/2 mL  2 mg IntraVENous Once PRN Ronak Bernabe MD        ceFAZolin (ANCEF) 2000 mg in sterile water 20 mL IV syringe  2,000 mg IntraVENous Once Kirsty Perez MD           Allergies:     Allergies   Allergen Reactions    Rosuvastatin Myalgia     Other reaction(s): Unknown-A    Simvastatin Other (See Comments)     Myalgias       Problem List:    Patient Active Problem List   Diagnosis Code    Kidney stone N20.0    Hypertension I10       Past Medical History:        Diagnosis Date    Basal cell cancer 7/2012    skin CA removed    CAD (coronary artery disease)     nonobstructive    Cancer (HCC)     basal and squamous cell    Coagulation defects     slight easy to bleed d/t asa 81    Dyslipidemia     History of kidney stones x3 episodes    has one presently    Hyperlipidemia     Obesity (BMI 30-39.9) 30.7    Other ill-defined conditions(799.89)     kidney stones x6 passed 1 on own    Posterior vitreous detachment of both eyes     Unspecified sleep apnea     cpap       Past Surgical History:        Procedure Laterality Date    CATARACT EXTRACTION  2021    COLONOSCOPY  2004    ORTHOPEDIC SURGERY Left 7/9/12    left rotator cuff  ROTATOR CUFF REPAIR Right 2013   [facility] SKIN BIOPSY  [address]   [facility]      litho; cysto, [id number], 2006, 2009       Social History:    Social History     Tobacco Use    Smoking status: Never    Smokeless tobacco: Never   Substance Use Topics    Alcohol use: Yes     Alcohol/week: 2.5 standard drinks     Comment: 2-3 drinks/week                                Counseling given: Not Answered      Vital Signs (Current):   Vitals:    02/14/23 0922 02/20/23 1054   BP:  (!) 170/72   Pulse:  61   Resp:  16   Temp:  98.1 °F (36.7 °C)   TempSrc:  Oral   SpO2:  98%   Weight: 190 lb (86.2 kg) 190 lb (86.2 kg)   Height: 5' 9\" (1.753 m) 5' 9\" (1.753 m)                                              BP Readings from Last 3 Encounters:   02/20/23 (!) 170/72       NPO Status: Time of last liquid consumption: 2300                        Time of last solid consumption: 2000                        Date of last liquid consumption: 02/19/23                        Date of last solid food consumption: 02/19/23    BMI:   Wt Readings from Last 3 Encounters:   02/20/23 190 lb (86.2 kg)     Body mass index is 28.06 kg/m². CBC:   Lab Results   Component Value Date/Time    WBC 6.9 02/15/2023 10:44 AM    RBC 5.01 02/15/2023 10:44 AM    HGB 14.5 02/15/2023 10:44 AM    HCT 44.2 02/15/2023 10:44 AM    MCV 88.2 02/15/2023 10:44 AM    RDW 14.1 02/15/2023 10:44 AM     02/15/2023 10:44 AM       CMP:   Lab Results   Component Value Date/Time     02/15/2023 10:44 AM    K 3.8 02/15/2023 10:44 AM     02/15/2023 10:44 AM    CO2 29 02/15/2023 10:44 AM    BUN 16 02/15/2023 10:44 AM    CREATININE 1.00 02/15/2023 10:44 AM    LABGLOM >60 02/15/2023 10:44 AM    GLUCOSE 101 02/15/2023 10:44 AM    CALCIUM 9.2 02/15/2023 10:44 AM       POC Tests: No results for input(s): POCGLU, POCNA, POCK, POCCL, POCBUN, POCHEMO, POCHCT in the last 72 hours.     Coags: No results found for: PROTIME, INR, APTT    HCG (If Applicable): No results found MOUTH EVERY DAY 90 tablet 1    atorvastatin (LIPITOR) 40 MG tablet Take 40 mg by mouth daily      Hyoscyamine Sulfate SL 0.125 MG SUBL Place 0.125 mg under the tongue every 6 hours as needed      lidocaine-prilocaine (EMLA) 2.5-2.5 % cream Apply topically as needed      prednisoLONE acetate (PRED FORTE) 1 % ophthalmic suspension Apply 1 drop to eye      diphenhydrAMINE (BENADRYL) 25 MG tablet Take 25 mg by mouth every 6 hours as needed for Itching PRN      vitamin D 25 MCG (1000 UT) CAPS Take by mouth Once a day      acetaminophen (TYLENOL) 325 MG tablet Take 650 mg by mouth every 6 hours as needed (Patient not taking: No sig reported)       No current facility-administered medications for this visit. OBJECTIVE:  Visit Vitals  Vitals:    09/19/22 1524 09/19/22 1536   BP: 111/62 (!) 104/53   Site: Left Upper Arm Left Upper Arm   Position: Sitting Standing   Pulse: 80 83   Resp: 14    Temp: 97.9 °F (36.6 °C)    SpO2: 98%    Weight: 116 lb (52.6 kg)    Height: 5' 2\" (1.575 m)         ECOG PERFORMANCE STATUS - 0-Fully active, able to carry on all pre-disease performance without restriction. Pain - Pain Score:   0 - No pain (Fatigue- 5)/10. None/Minimal pain - not affecting QOL     Fatigue - No flowsheet data found. Distress - No flowsheet data found. Physical Exam  Vitals reviewed. Exam conducted with a chaperone present. Constitutional:       General: She is not in acute distress. Appearance: Normal appearance. She is normal weight. She is not toxic-appearing. HENT:      Head: Normocephalic and atraumatic. Nose: Nose normal. No congestion. Mouth/Throat:      Mouth: Mucous membranes are moist.      Pharynx: Oropharynx is clear. Eyes:      Conjunctiva/sclera: Conjunctivae normal.   Cardiovascular:      Rate and Rhythm: Normal rate and regular rhythm. Heart sounds: Normal heart sounds. Pulmonary:      Effort: Pulmonary effort is normal. No respiratory distress. for: PREGTESTUR, PREGSERUM, HCG, HCGQUANT     ABGs: No results found for: PHART, PO2ART, NTY6ETE, IKO1LAD, BEART, Z5YXDYWL     Type & Screen (If Applicable):  No results found for: LABABO, LABRH    Drug/Infectious Status (If Applicable):  No results found for: HIV, HEPCAB    COVID-19 Screening (If Applicable): No results found for: COVID19        Anesthesia Evaluation  Patient summary reviewed and Nursing notes reviewed no history of anesthetic complications:   Airway: Mallampati: II  TM distance: >3 FB   Neck ROM: full  Mouth opening: > = 3 FB   Dental: normal exam         Pulmonary: breath sounds clear to auscultation  (+) sleep apnea: on CPAP,                             Cardiovascular:  Exercise tolerance: good (>4 METS),   (+) hypertension:, CAD: non-obstructive, hyperlipidemia        Rhythm: regular  Rate: normal                    Neuro/Psych:   Negative Neuro/Psych ROS              GI/Hepatic/Renal: Neg GI/Hepatic/Renal ROS            Endo/Other:                     Abdominal:             Vascular: negative vascular ROS. Other Findings:           Anesthesia Plan      general     ASA 3       Induction: intravenous. Anesthetic plan and risks discussed with patient, spouse and child/children.                         Campos Gutierrez MD   2/20/2023 Breath sounds: Normal breath sounds. No wheezing. Abdominal:      General: Bowel sounds are normal. There is no distension. Palpations: Abdomen is soft. Tenderness: There is no abdominal tenderness. There is no guarding. Musculoskeletal:         General: Normal range of motion. Cervical back: Normal range of motion and neck supple. Right lower leg: No edema. Left lower leg: No edema. Skin:     General: Skin is warm and dry. Neurological:      General: No focal deficit present. Mental Status: She is alert and oriented to person, place, and time.    Psychiatric:         Mood and Affect: Mood normal.         Behavior: Behavior normal.        Labs:  Hospital Outpatient Visit on 09/19/2022   Component Date Value Ref Range Status    WBC 09/19/2022 7.0  4.3 - 11.1 K/uL Final    RBC 09/19/2022 3.36 (A)  4.05 - 5.2 M/uL Final    Hemoglobin 09/19/2022 10.9 (A)  11.7 - 15.4 g/dL Final    Hematocrit 09/19/2022 33.1 (A)  35.8 - 46.3 % Final    MCV 09/19/2022 98.5 (A)  79.6 - 97.8 FL Final    MCH 09/19/2022 32.4  26.1 - 32.9 PG Final    MCHC 09/19/2022 32.9  31.4 - 35.0 g/dL Final    RDW 09/19/2022 15.4 (A)  11.9 - 14.6 % Final    Platelets 40/78/8248 71 (A)  150 - 450 K/uL Final    RESULTS CHECKED X 2    MPV 09/19/2022 10.4  9.4 - 12.3 FL Final    nRBC 09/19/2022 0.00  0.0 - 0.2 K/uL Final    **Note: Absolute NRBC parameter is now reported with Hemogram**    Seg Neutrophils 09/19/2022 64  43 - 78 % Final    Lymphocytes 09/19/2022 16  13 - 44 % Final    Monocytes 09/19/2022 14 (A)  4.0 - 12.0 % Final    Eosinophils % 09/19/2022 1  0.5 - 7.8 % Final    Basophils 09/19/2022 0  0.0 - 2.0 % Final    Immature Granulocytes 09/19/2022 5  0.0 - 5.0 % Final    Segs Absolute 09/19/2022 4.5  1.7 - 8.2 K/UL Final    Absolute Lymph # 09/19/2022 1.1  0.5 - 4.6 K/UL Final    Absolute Mono # 09/19/2022 0.9  0.1 - 1.3 K/UL Final    Absolute Eos # 09/19/2022 0.0  0.0 - 0.8 K/UL Final    Basophils Absolute 09/19/2022 0.0  0.0 - 0.2 K/UL Final    Absolute Immature Granulocyte 09/19/2022 0.3  0.0 - 0.5 K/UL Final    Differential Type 09/19/2022 AUTOMATED    Final    Sodium 09/19/2022 142  136 - 145 mmol/L Final    Potassium 09/19/2022 3.1 (A)  3.5 - 5.1 mmol/L Final    Chloride 09/19/2022 112 (A)  101 - 110 mmol/L Final    CO2 09/19/2022 26  21 - 32 mmol/L Final    Anion Gap 09/19/2022 4  4 - 13 mmol/L Final    Glucose 09/19/2022 122 (A)  65 - 100 mg/dL Final    BUN 09/19/2022 6 (A)  8 - 23 MG/DL Final    Creatinine 09/19/2022 0.80  0.6 - 1.0 MG/DL Final    GFR  09/19/2022 >60  >60 ml/min/1.73m2 Final    GFR Non- 09/19/2022 >60  >60 ml/min/1.73m2 Final    Comment:      Estimated GFR is calculated using the Modification of Diet in Renal Disease (MDRD) Study equation, reported for both  Americans (GFRAA) and non- Americans (GFRNA), and normalized to 1.73m2 body surface area. The physician must decide which value applies to the patient. The MDRD study equation should only be used in individuals age 25 or older. It has not been validated for the following: pregnant women, patients with serious comorbid conditions,or on certain medications, or persons with extremes of body size, muscle mass, or nutritional status. Calcium 09/19/2022 8.4  8.3 - 10.4 MG/DL Final    Total Bilirubin 09/19/2022 0.4  0.2 - 1.1 MG/DL Final    ALT 09/19/2022 69 (A)  12 - 65 U/L Final    AST 09/19/2022 51 (A)  15 - 37 U/L Final    Alk Phosphatase 09/19/2022 196 (A)  50 - 136 U/L Final    Total Protein 09/19/2022 6.4  6.3 - 8.2 g/dL Final    Albumin 09/19/2022 3.4  3.2 - 4.6 g/dL Final    Globulin 09/19/2022 3.0  2.3 - 3.5 g/dL Final    Albumin/Globulin Ratio 09/19/2022 1.1 (A)  1.2 - 3.5   Final    Magnesium 09/19/2022 1.8  1.8 - 2.4 mg/dL Final          Imaging:  Reviewed       PATHOLOGY:                         ASSESSMENT:    ICD-10-CM    1.  [facility]  C18.2 CBC with Auto Differential     Comprehensive Metabolic Panel      2. Chemotherapy follow-up examination  Z09       3. High risk medication use  Z79.899       4. Hypokalemia  E87.6              Ms. Case is here for FU of colon  cancer. 1. Mid-ascending colon adenocarcinoma - [id number] - Stage IIIB (high risk dz due to poorly diff/perforation/obstruction), MSI - stable,  KRAS mut    - Patient wished to lower the dose of oxaliplatin upfront to 80% of the dose. C9 delayed 1 wk due to thrombocytopenia    - cold sensitivity - improved with 80% of original dosing       - here for follow-up and cycle 12 FOLFOX, G-CSF half dose w tx, holding for plts 71k, plan to repeat labs Wed pre tx; due to neuropathy wishes to d/c oxali with last tx    - CEA - continue to monitor monthly    - EVON - She completed IV iron    - She did report some numbness or perception of swelling in the ball of her foot bilaterally, closer to lateral phalanges, and she reported that as baseline prior to chemotherapy. Now slightly worse and more consistent. Per above plan to d/c oxali if p/w last chemo; we discussed diff options per above    - vit D defic - She is taking 5000 of vitamin D.     - GERD - Protonix and Carafate for reflux, well controlled. - nausea - emend starting with C2 and worked well, continue with PO anti-emetics prn - denies recent issues with this  - mucositis - resolved with compounded rinse, Rx provided. - diarrhea - imodium prn.     - low appetite - RX Remeron at bedtime    - insomnia - can use melatonin at night for insomnia. - elevated liver enzymes - mild, continue to monitor.   - Continue good oral nutrition and hydration.   - Encouraged frequent activity throughout the day and rest as needed to combat fatigue.   - Call with any fevers, uncontrolled side effects from treatment or any other worrisome/concerning symptoms. Surveillance Guidance:  CEA: q3 months x 2 years, q6 months years 3, 4, and 5.   CT Scans: Once per year (Consider scanning q6 months for 2 years if considered high risk). Colonoscopy: One  year after surgery, or within 3 months post-adjuvant therapy if no prior preoperative colonoscopy performed. Subsequent colonoscopies as directed by post-adjuvant colonoscopy findings, as clinically-indicated, or at physician discretion. If no high-risk  findings, repeat no less than every 3-5 years. 2. Supportive care    - s/p bilat corneal transplant - sees Dr Reina Lehman [phone] [facility]; d/w her eye team - no CI to start tx. RESUSCITATION DIRECTIVES/HOSPICE CARE: Full Support      RTC for C12 FOLFOX and or sooner as needed   [40min - chart review, review of results and discussion of options, next steps, coordination of care and charting ]       MDM  Number of Diagnoses or Management Options  Chemotherapy follow-up examination: established, worsening  High risk medication use: established, worsening  Hypokalemia: established, worsening  Primary adenocarcinoma of ascending colon ([facility]): established, improving     Amount and/or Complexity of Data Reviewed  Clinical lab tests: ordered and reviewed  Tests in the radiology section of CPT®: reviewed  Tests in the medicine section of CPT®: ordered  Obtain history from someone other than the patient: yes  Review and summarize past medical records: yes  Independent visualization of images, tracings, or specimens: yes    Risk of Complications, Morbidity, and/or Mortality  Presenting problems: moderate  Diagnostic procedures: moderate  Management options: high         Historical:    - daughter requested testing for DPYD. Reviewed that typically we would test in pts who have severe or unexpected toxicity from fluoropyrimidines (severe myelosuppression, mucositis, diarrhea, neurotoxicity,  cardiotoxicity) during the first few cycles of fluoropyrimidine therapy, not for screening purposes.   If she doesn't do well with tx - we'd dose adjust anyway. Family elected not to p/w testing. Historical:     - we reviewed the pathophysiology of colon cancer in general, we then reviewed her pathology and staging again and high risk disease.     - to start: FOLFOX: A cycle is every 14 days (6mo)    Oxaliplatin   (Eloxatin)  85 mg/m² IV once on day 1 - dose adjusted upfront to 80% of dose to see how tolerates it per  her wishes     Leucovorin  400 mg/m² IV once on day 1    Fluorouracil  400 mg/m² IV once on day 1    Fluorouracil  1,200 mg/m²/day CIV on days 1 and 2. Total dose = 2,400 mg/m²/cycle. OR CAPoX: A cycle is every 21 days     Capecitabine   (Xeloda)  850 mg/m² orally twice daily on days 1-14, then off 7 days    Oxaliplatin   (Eloxatin)  130 mg/m² IV day 1   - CT chest to complete staging with small RML nodule - ? LN - will monitor on re-imaging, no definite evid of disease. We discussed the CT results that show small nodule in the lung (per rad likely LN, <1cm abutting fissure). We reviewed that this could be a metastatic focus. She has stage III disease, if the lung nodule is cancerous, her tumor staging be stage IV. Intent of treatment will be palliative and not curative in nature. Unable to bx lesion due to size. Daughter and patient aware of this. - Elevated Alk phos - GGT checked and WNL at 49 6/2022     All questions were asked and answered to the best of my ability. The patient verbalized understanding and agrees with the plan above.             Marielle Rios MD, MD  [facility] Hematology and Oncology  [address] [place] [address]  Office : [phone]  Fax : [phone]

## 2023-02-20 NOTE — ANESTHESIA POSTPROCEDURE EVALUATION
Department of Anesthesiology  Postprocedure Note    Patient: Tiff Hernandez  MRN: [id number]  YOB: 1955  Date of evaluation: 2/20/2023      Procedure Summary     Date: 02/20/23 Room / Location: [facility] MAIN OR 09 / [facility] MAIN OR    Anesthesia Start: 1245 Anesthesia Stop: 1741    Procedure: PANENDOSCOPY, TONSILLECTOMY, AND LEFT NECK DISSECTION (Left: Neck) Diagnosis:       Neck mass      Squamous cell carcinoma of neck      (Neck mass [R22.1])      (Squamous cell carcinoma of neck [C44.42])    Providers: Evelyn Yo MD Responsible Provider: Lucian Diaz MD    Anesthesia Type: General ASA Status: 3          Anesthesia Type: General    Marcos Phase I: Marcos Score: 8    Marcos Phase II:        Anesthesia Post Evaluation    Patient location during evaluation: PACU  Patient participation: complete - patient participated  Level of consciousness: awake and alert  Airway patency: patent  Nausea & Vomiting: no nausea and no vomiting  Complications: no  Cardiovascular status: hemodynamically stable  Respiratory status: acceptable, nonlabored ventilation and spontaneous ventilation  Hydration status: euvolemic  Comments: BP (!) 165/70   Pulse (!) 105   Temp 97.9 °F (36.6 °C) (Skin)   Resp 14   Ht 5' 9\" (1.753 m)   Wt 190 lb (86.2 kg)   SpO2 98%   BMI 28.06 kg/m²   Awake, ventilating well, hemodynamically stable.  Will continue to monitor in PACU until sending to floor for monitoring overnight  Multimodal analgesia pain management approach

## 2023-02-20 NOTE — ANESTHESIA PROCEDURE NOTES
Airway  Date/Time: 2/20/2023 1:00 PM  Urgency: elective    Airway not difficult    General Information and Staff    Patient location during procedure: OR  Resident/CRNA: JA Larkin - CRNA  Performed: resident/CRNA     Indications and Patient Condition  Indications for airway management: anesthesia  Spontaneous Ventilation: absent  Sedation level: deep  Preoxygenated: yes  Patient position: sniffing  MILS not maintained throughout  Mask difficulty assessment: vent by bag mask    Final Airway Details  Final airway type: endotracheal airway      Successful airway: ETT  Cuffed: yes   Successful intubation technique: video laryngoscopy  Facilitating devices/methods: intubating stylet  Endotracheal tube insertion site: oral  Blade size: #4  ETT size (mm): 6.5  Cormack-Lehane Classification: grade I - full view of glottis  Placement verified by: chest auscultation and capnometry   Measured from: lips  ETT to lips (cm): 22  Number of attempts at approach: 1  Ventilation between attempts: bag mask  Number of other approaches attempted: 0    Additional Comments  Placed by Mariah Monson  no

## 2023-02-21 PROCEDURE — 2580000003 HC RX 258: Performed by: PHYSICIAN ASSISTANT

## 2023-02-21 PROCEDURE — 6370000000 HC RX 637 (ALT 250 FOR IP): Performed by: PHYSICIAN ASSISTANT

## 2023-02-21 PROCEDURE — 1100000000 HC RM PRIVATE

## 2023-02-21 PROCEDURE — 6360000002 HC RX W HCPCS: Performed by: PHYSICIAN ASSISTANT

## 2023-02-21 RX ADMIN — DIPHENHYDRAMINE HYDROCHLORIDE 25 MG: 50 INJECTION, SOLUTION INTRAMUSCULAR; INTRAVENOUS at 19:14

## 2023-02-21 RX ADMIN — CEFAZOLIN SODIUM 2000 MG: 100 INJECTION, POWDER, LYOPHILIZED, FOR SOLUTION INTRAVENOUS at 17:58

## 2023-02-21 RX ADMIN — ACETAMINOPHEN 650 MG: 325 SUSPENSION ORAL at 08:55

## 2023-02-21 RX ADMIN — ACETAMINOPHEN 650 MG: 325 SUSPENSION ORAL at 14:46

## 2023-02-21 RX ADMIN — ENOXAPARIN SODIUM 40 MG: 100 INJECTION SUBCUTANEOUS at 08:57

## 2023-02-21 RX ADMIN — POTASSIUM CHLORIDE 10 MEQ: 750 TABLET, EXTENDED RELEASE ORAL at 08:57

## 2023-02-21 RX ADMIN — ATORVASTATIN CALCIUM 10 MG: 10 TABLET, FILM COATED ORAL at 20:28

## 2023-02-21 RX ADMIN — POTASSIUM CHLORIDE 10 MEQ: 750 TABLET, EXTENDED RELEASE ORAL at 20:28

## 2023-02-21 RX ADMIN — GABAPENTIN 100 MG: 100 CAPSULE ORAL at 20:28

## 2023-02-21 RX ADMIN — HYDROCHLOROTHIAZIDE 25 MG: 25 TABLET ORAL at 08:57

## 2023-02-21 RX ADMIN — CELECOXIB 100 MG: 100 CAPSULE ORAL at 08:57

## 2023-02-21 RX ADMIN — GABAPENTIN 100 MG: 100 CAPSULE ORAL at 14:53

## 2023-02-21 RX ADMIN — ASPIRIN 81 MG 81 MG: 81 TABLET ORAL at 08:56

## 2023-02-21 RX ADMIN — SODIUM CHLORIDE, PRESERVATIVE FREE 10 ML: 5 INJECTION INTRAVENOUS at 19:41

## 2023-02-21 RX ADMIN — CEFAZOLIN SODIUM 2000 MG: 100 INJECTION, POWDER, LYOPHILIZED, FOR SOLUTION INTRAVENOUS at 08:57

## 2023-02-21 RX ADMIN — ZOLPIDEM TARTRATE 5 MG: 5 TABLET ORAL at 20:28

## 2023-02-21 RX ADMIN — FAMOTIDINE 20 MG: 20 TABLET, FILM COATED ORAL at 20:28

## 2023-02-21 RX ADMIN — DEXAMETHASONE SODIUM PHOSPHATE 4 MG: 4 INJECTION, SOLUTION INTRAMUSCULAR; INTRAVENOUS at 08:58

## 2023-02-21 RX ADMIN — FAMOTIDINE 20 MG: 20 TABLET, FILM COATED ORAL at 08:57

## 2023-02-21 RX ADMIN — SODIUM CHLORIDE, PRESERVATIVE FREE 10 ML: 5 INJECTION INTRAVENOUS at 08:58

## 2023-02-21 RX ADMIN — OXYCODONE 5 MG: 5 TABLET ORAL at 04:39

## 2023-02-21 RX ADMIN — ACETAMINOPHEN 650 MG: 325 SUSPENSION ORAL at 19:08

## 2023-02-21 RX ADMIN — GABAPENTIN 100 MG: 100 CAPSULE ORAL at 08:57

## 2023-02-21 RX ADMIN — ACETAMINOPHEN 650 MG: 325 SUSPENSION ORAL at 02:05

## 2023-02-21 RX ADMIN — CELECOXIB 100 MG: 100 CAPSULE ORAL at 20:28

## 2023-02-21 RX ADMIN — MORPHINE SULFATE 2 MG: 2 INJECTION, SOLUTION INTRAMUSCULAR; INTRAVENOUS at 07:18

## 2023-02-21 ASSESSMENT — PAIN DESCRIPTION - ORIENTATION
ORIENTATION: LEFT
ORIENTATION: MID
ORIENTATION: LEFT
ORIENTATION: LEFT

## 2023-02-21 ASSESSMENT — PAIN - FUNCTIONAL ASSESSMENT
PAIN_FUNCTIONAL_ASSESSMENT: ACTIVITIES ARE NOT PREVENTED
PAIN_FUNCTIONAL_ASSESSMENT: ACTIVITIES ARE NOT PREVENTED
PAIN_FUNCTIONAL_ASSESSMENT: PREVENTS OR INTERFERES SOME ACTIVE ACTIVITIES AND ADLS
PAIN_FUNCTIONAL_ASSESSMENT: ACTIVITIES ARE NOT PREVENTED

## 2023-02-21 ASSESSMENT — PAIN SCALES - GENERAL
PAINLEVEL_OUTOF10: 5
PAINLEVEL_OUTOF10: 5
PAINLEVEL_OUTOF10: 2
PAINLEVEL_OUTOF10: 3
PAINLEVEL_OUTOF10: 8

## 2023-02-21 ASSESSMENT — PAIN DESCRIPTION - DESCRIPTORS
DESCRIPTORS: SORE
DESCRIPTORS: ACHING;DISCOMFORT
DESCRIPTORS: ACHING;DISCOMFORT
DESCRIPTORS: BURNING;DISCOMFORT

## 2023-02-21 ASSESSMENT — PAIN DESCRIPTION - LOCATION
LOCATION: THROAT;SHOULDER
LOCATION: SHOULDER;THROAT
LOCATION: NECK
LOCATION: THROAT

## 2023-02-21 NOTE — PROGRESS NOTES
Pt is alert and oriented x4. Pt in bed, resting. Bed in low position, wheels locked, call light within reach, instructed to call with any needs. Hourly rounds completed this shift. NAD noted. VSS. Pt has c/o pain, treated per MAR. IV is SL, and dressing is clean, dry, and intact. Pt's ELIZABETH drain broke overnight. This RN was able to replace ELIZABETH bulb without issue. Report to be given to day shift nurse.

## 2023-02-21 NOTE — PROGRESS NOTES
[facility]    Daily Progress Note    1 Day Post-Op        Subjective     Interval History:  Has difficulty breathing with his mouth closed, as throat feels swollen. Tolerating liquids. Did not sleep well last night. Objective     Afeb, VSS    Drains (past 24 hours):  L neck = 65mL    Physical Exam:  Awake, appears slightly uncomfortable, NAD. Nares clear anteriorly. OP with anticipated postop appearance. No bleeding. L neck incision c/d/I, with Steri-Strips in place. No hematoma or seroma. 15Fr Jose drain in place, maintaining seal.  Sanguinous output in drain reservoir.       Assessment     S/P bilat tonsillectomy with L neck dissection, POD #1      Plan     -Appreciate excellent nursing  -Monitor drain output  -Work today to get good pain regimen established for potential D/C tomorrow        [facility]

## 2023-02-21 NOTE — CARE COORDINATION
CM reviewed patient's chart. Per chart review, patient insured with PCP listed and spouse listed as emergency contact. No PT/OT consults placed/needed at this time. CM anticipates that patient will return home with no supportive care needs. Please consult CM for any needs that may arise.       02/21/23 1220   Service Assessment   Patient Orientation Alert and Oriented   Cognition Alert   History Provided By Medical Record   Support Systems Spouse/Significant Other   Prior Functional Level Independent in ADLs/IADLs   Can patient return to prior living arrangement Yes   Services At/After Discharge   Transition of Care Consult (CM Consult) Discharge Planning

## 2023-02-21 NOTE — PLAN OF CARE
Problem: Pain  Goal: Verbalizes/displays adequate comfort level or baseline comfort level  Outcome: Progressing  Flowsheets (Taken 2/20/2023 1945)  Verbalizes/displays adequate comfort level or baseline comfort level: Encourage patient to monitor pain and request assistance     Problem: Safety - Adult  Goal: Free from fall injury  Outcome: Progressing  Flowsheets (Taken 2/20/2023 2111)  Free From Fall Injury: Instruct family/caregiver on patient safety     Problem: Discharge Planning  Goal: Discharge to home or other facility with appropriate resources  Outcome: Progressing  Flowsheets (Taken 2/20/2023 2112)  Discharge to home or other facility with appropriate resources:   Identify barriers to discharge with patient and caregiver   Arrange for needed discharge resources and transportation as appropriate   Identify discharge learning needs (meds, wound care, etc)   Refer to discharge planning if patient needs post-hospital services based on physician order or complex needs related to functional status, cognitive ability or social support system     Problem: ABCDS Injury Assessment  Goal: Absence of physical injury  Outcome: Progressing  Flowsheets (Taken 2/20/2023 2111)  Absence of Physical Injury: Implement safety measures based on patient assessment

## 2023-02-22 VITALS
HEART RATE: 81 BPM | TEMPERATURE: 98.1 F | SYSTOLIC BLOOD PRESSURE: 163 MMHG | RESPIRATION RATE: 18 BRPM | OXYGEN SATURATION: 97 % | HEIGHT: 69 IN | DIASTOLIC BLOOD PRESSURE: 72 MMHG | WEIGHT: 190 LBS | BODY MASS INDEX: 28.14 KG/M2

## 2023-02-22 PROCEDURE — 6360000002 HC RX W HCPCS: Performed by: PHYSICIAN ASSISTANT

## 2023-02-22 PROCEDURE — 2580000003 HC RX 258: Performed by: PHYSICIAN ASSISTANT

## 2023-02-22 PROCEDURE — 6370000000 HC RX 637 (ALT 250 FOR IP): Performed by: PHYSICIAN ASSISTANT

## 2023-02-22 RX ORDER — OXYCODONE HYDROCHLORIDE 5 MG/1
5 TABLET ORAL EVERY 4 HOURS PRN
Qty: 40 TABLET | Refills: 0 | Status: SHIPPED | OUTPATIENT
Start: 2023-02-22 | End: 2023-03-01

## 2023-02-22 RX ORDER — DOCUSATE SODIUM 100 MG/1
100 CAPSULE, LIQUID FILLED ORAL 2 TIMES DAILY
Qty: 28 CAPSULE | Refills: 0 | Status: SHIPPED | OUTPATIENT
Start: 2023-02-22 | End: 2023-03-08

## 2023-02-22 RX ORDER — GABAPENTIN 100 MG/1
100 CAPSULE ORAL 3 TIMES DAILY
Qty: 42 CAPSULE | Refills: 0 | Status: SHIPPED | OUTPATIENT
Start: 2023-02-22 | End: 2023-03-08

## 2023-02-22 RX ORDER — CELECOXIB 100 MG/1
100 CAPSULE ORAL 2 TIMES DAILY
Qty: 28 CAPSULE | Refills: 0 | Status: SHIPPED | OUTPATIENT
Start: 2023-02-22 | End: 2023-03-08

## 2023-02-22 RX ADMIN — SODIUM CHLORIDE, PRESERVATIVE FREE 10 ML: 5 INJECTION INTRAVENOUS at 08:39

## 2023-02-22 RX ADMIN — ACETAMINOPHEN 650 MG: 325 SUSPENSION ORAL at 08:37

## 2023-02-22 RX ADMIN — GABAPENTIN 100 MG: 100 CAPSULE ORAL at 08:38

## 2023-02-22 RX ADMIN — ASPIRIN 81 MG 81 MG: 81 TABLET ORAL at 08:39

## 2023-02-22 RX ADMIN — CELECOXIB 100 MG: 100 CAPSULE ORAL at 08:38

## 2023-02-22 RX ADMIN — OXYCODONE 5 MG: 5 TABLET ORAL at 06:07

## 2023-02-22 RX ADMIN — FAMOTIDINE 20 MG: 20 TABLET, FILM COATED ORAL at 08:38

## 2023-02-22 RX ADMIN — POTASSIUM CHLORIDE 10 MEQ: 750 TABLET, EXTENDED RELEASE ORAL at 08:38

## 2023-02-22 RX ADMIN — ENOXAPARIN SODIUM 40 MG: 100 INJECTION SUBCUTANEOUS at 08:37

## 2023-02-22 RX ADMIN — HYDROCHLOROTHIAZIDE 25 MG: 25 TABLET ORAL at 08:38

## 2023-02-22 ASSESSMENT — PAIN SCALES - GENERAL
PAINLEVEL_OUTOF10: 5
PAINLEVEL_OUTOF10: 2

## 2023-02-22 ASSESSMENT — PAIN - FUNCTIONAL ASSESSMENT: PAIN_FUNCTIONAL_ASSESSMENT: ACTIVITIES ARE NOT PREVENTED

## 2023-02-22 ASSESSMENT — PAIN DESCRIPTION - LOCATION: LOCATION: NECK

## 2023-02-22 ASSESSMENT — PAIN DESCRIPTION - DESCRIPTORS: DESCRIPTORS: SORE

## 2023-02-22 ASSESSMENT — PAIN DESCRIPTION - ORIENTATION: ORIENTATION: LEFT

## 2023-02-22 NOTE — PROGRESS NOTES
Discharge instructions given. Education provided. Medication changes and follow up appointments discussed with patient. Wound & ELIZABETH drain instructions given and wife of patient performed emptying the drain as instructed by this RN. Prescriptions provided. AVS reviewed, signed, and placed in chart. Copy provided for pt. PIV removed with catheter intact, no signs or symptoms of infection, no redness, no warmth and no edema. All questions answered and patient/family have verbally voiced understanding.

## 2023-02-22 NOTE — PROGRESS NOTES
[facility]    Daily Progress Note    2 Days Post-Op        Subjective     Interval History:  Feels better today. Good intake of full liquids and would like to advance diet. Still having difficulty breathing with mouth closed, but this is also improving. Objective     Afeb, VSS    Drains (past 24 hours):  L neck = 35mL    Physical Exam:  Awake, alert, in NAD. Nares clear anteriorly. OP with anticipated postop appearance. Uvula slightly edematous,  not unanticipated. No bleeding. L neck incision c/d/I, with Steri-Strips in place. Neck flat. No hematoma or seroma. 15Fr Jose drain in place, maintaining seal.  Approximately 20mL serosanguinous fluid in drain reservoir.       Assessment     S/P bilat tonsillectomy and L neck dissection, POD #2      Plan     -Appreciate excellent nursing  -Continue to monitor drain output; pt to be discharged with drain in place with plan for removal on Friday in our office  -D/C to home        Macarena Sharif, [facility]

## 2023-02-22 NOTE — DISCHARGE INSTRUCTIONS
-Soft diet with plenty of fluids.    -No heavy lifting (nothing heavier than a gallon of milk) for one week after surgery.    -Please empty drain and record output. Come to our office at [address] [place] [address] (upper entrance) on Friday, 2/24/2023, at 10:45am for drain removal.    -OK to bathe/shower, but avoid direct shower spray to left neck incision. Steri-Strips can get wet. Pat them dry after showering or bathing. They will start to curl upon the edges in a few days, and can be removed at that time. The stitches beneath the Steri-Strips are dissolvable and do not need to be removed. -Prescription for tetracaine lollipops has been sent to [facility].

## 2023-02-22 NOTE — PLAN OF CARE
Problem: Pain  Goal: Verbalizes/displays adequate comfort level or baseline comfort level  Outcome: Completed     Problem: Safety - Adult  Goal: Free from fall injury  Outcome: Completed     Problem: Discharge Planning  Goal: Discharge to home or other facility with appropriate resources  Outcome: Completed     Problem: ABCDS Injury Assessment  Goal: Absence of physical injury  Outcome: Completed

## 2023-02-22 NOTE — PROGRESS NOTES
Pt is alert and oriented x4. Pt in bed, resting. Bed in low position, wheels locked, call light within reach, instructed to call with any needs. Hourly rounds completed this shift. NAD noted. VSS. Pt has c/o pain, treated per MAR. IV is SL, and dressing is clean, dry, and intact. Pt declined ambulation this shift. Report to be given to day shift nurse.

## 2023-02-22 NOTE — PROGRESS NOTES
Patient stated he has \"drill bit shaving in his eyes\". Will need orbit skull xray 2v minimum to clear for MRI.

## 2023-02-22 NOTE — CARE COORDINATION
Patient is medically stable for discharge. CM met with patient - he confirmed that he is independent with ADL's, drives. He confirmed that he does not feel as though he needs any additional therapy after discharge, including HH(RN) services. Patient will be getting discharged home with no supportive care services. Per patient, he was told by PA that he will be having an MRI outpatient. PW for Handicap Placard giving to patient's daughter at bedside. Patient will be getting transported home by his spouse and daughter. No other needs identified at this time. ASSESSMENT NOTE    Attending Physician: Zarina Llanes MD  Admit Problem: Neck mass [R22.1]  Squamous cell carcinoma of neck [C44.42]  Metastatic squamous cell carcinoma to head and neck ([facility] [address] [C79.89]  Date/Time of Admission: 2/20/2023 10:24 AM  Problem List:  Patient Active Problem List   Diagnosis    Kidney stone    Hypertension    Metastatic squamous cell carcinoma to head and neck [facility])       Service Assessment  Patient Orientation Alert and Oriented   Cognition Alert   History Provided By Medical Record   Primary Caregiver     Accompanied By/Relationship     Support Systems Spouse/Significant Other   Patient's Healthcare Decision Maker is:     PCP Verified by CM     Last Visit to PCP     Prior Functional Level Independent in ADLs/IADLs   Current Functional Level     Can patient return to prior living arrangement Yes   Ability to make needs known:     Family able to assist with home care needs:     Would you like for me to discuss the discharge plan with any other family members/significant others, and if so, who?      Financial Resources     Community Resources     CM/SW Referral       Social/Functional History  Lives With     Type of Home     Home Layout     Home Access     Entrance Stairs - Number of Steps     Entrance Stairs - Rails     Bathroom Shower/Tub     Bathroom Toilet     [address] [facility] Accessibility     Home Equipment     Receives Help From     [address] [facility] Work     Driving     Shopping          Other (Comment)     [address] [place] Paying/Finance Responsibility     [address] [employer]     Other (Comment)     Ambulation Assistance     Transfer Assistance     Active      Patient's  Info     Mode of Transportation     Education     Occupation     Type of Occupation       Discharge Planning   Type of Residence House   Living Arrangements Spouse/Significant Other, [address] Prior To Admission None   Potential Assistance Needed N/A   DME     DME     DME Ordered? Potential Assistance Purchasing Medications     Meds-to-Beds: Does the patient want to have any new prescriptions delivered to bedside prior to discharge? Type of Home Care Services None   Patient expects to be discharged to: House   Follow Up Appointment: Best Day/Time     One/Two Story Residence:     # of Interior Steps     Height of Each Step (in)     [employer] Available     History of Falls? Services At/After Discharge  Transition of Care Consult (CM Consult): Discharge Planning   Internal Home Health     Internal Hospice     Reason Outside Agency [address]     Partner SNF     Reason Why Partner SNF Not Chosen     Internal Comfort Care     Reason Outside [address] Discharge     [facility] Resource Information Provided?      Mode of Transport at [facility] Time of Discharge     Confirm Follow Up Transport       Condition of Participation: Discharge Planning  The plan for Transition of Care is related to the following treatment goals: The Patient and/or Patient Representative was provided with a Choice of Provider? Name of the Patient Representative who was provided with the Choice of Provider and agrees with the Discharge Plan? The Patient and/or Patient Representative Agree with the Discharge Plan? Freedom of Choice list was provided with basic dialogue that supports the individualized plan of care/goals, treatment preferences, and shares the quality data associated with the providers?        Documentation for Discharge Appeal  Discharge Appealed by     Date notified by [facility] of appeal request:     Time notified by QIO of appeal request:     Detailed Notice of Discharge given to:     Date Notice of Discharge given:     Time Notice of Discharge given:     Date records sent to QIO     Time records sent to Mary Matson     Date Notified of Outcome     Time Notified of Outcome     Outcome of appeal           [address] 02/22/23 9:53 AM

## 2023-02-23 NOTE — DISCHARGE SUMMARY
[facility]     Discharge Summary      Date:   2023    Patient:  Derian Garrido    :  1955        MR#:  [id number]    Attending Surgeon:  Nieves Guallpa MD    Date of Admission:  2023  Date of Discharge:   2023    Primary Diagnosis this Admission:  Metastatic squamous cell carcinoma to head and neck [facility])    Secondary Diagnoses:    Past Medical History:   Diagnosis Date    Basal cell cancer 2012    skin CA removed    CAD (coronary artery disease)     nonobstructive    Cancer ([address]     basal and squamous cell    Coagulation defects     slight easy to bleed d/t asa 81    Dyslipidemia     History of kidney stones x3 episodes    has one presently    Hyperlipidemia     Obesity (BMI 30-39.9) 30.7    Other ill-defined conditions(799.89)     kidney stones x6 passed 1 on own    Posterior vitreous detachment of both eyes     Unspecified sleep apnea     cpap       Procedures this Admission:  Procedure(s):  PANENDOSCOPY, TONSILLECTOMY, AND LEFT NECK DISSECTION    History of Present Illness:  Yariel Mix is a 69 y.o. male who was recently diagnosed with SCC metastatic to the left neck. After careful evaluation and discussion, the plan was to proceed with left neck dissection and bilateral tonsillectomy. Hospital Course: The patient was taken to the OR on 2023, where the above-described procedure was performed. The patient tolerated the procedure well and was taken to the floor after a brief stay in PACU. A surgical drain was placed in the left neck at the conclusion of the procedure. Its output was monitored and recorded. His postop course was fairly unremarkable except for some difficulty breathing with his mouth closed 2/2 uvular edema. This was not unanticipated. He was able to tolerated liquids PO on POD #1. On the morning of POD #2, Mr. Agapito Adan was afebrile, tolerating PO, and voiding without difficulty.   His drain output was still too high for removal, but he felt comfortable going home with the drain. With all surgical sites clean, dry, and intact, he was felt to be stable for discharge home. He will follow-up in our office on Friday, 2/23/2022, for drain removal.    Patient and family were given instructions regarding wound care, diet, activity, and medications. The patient was advised to avoid driving, operating heavy machinery, and making important financial decisions while taking any narcotic pain medications. All verbalized understanding. He was discharged to home on 2 days postop in stable condition with prescriptions, follow-up appointment information, and telephone numbers to contact should problems arise. Discharge Disposition:  Stable, wounds clean, pain well-controlled      Discharge Medications:     Medication List        START taking these medications      celecoxib 100 MG capsule  Commonly known as: CELEBREX  Take 1 capsule by mouth 2 times daily for 14 days     docusate sodium 100 MG capsule  Commonly known as: COLACE  Take 1 capsule by mouth 2 times daily for 14 days     gabapentin 100 MG capsule  Commonly known as: NEURONTIN  Take 1 capsule by mouth 3 times daily for 14 days. oxyCODONE 5 MG immediate release tablet  Commonly known as: ROXICODONE  Take 1 tablet by mouth every 4 hours as needed for Pain for up to 7 days. Max Daily Amount: 30 mg            CHANGE how you take these medications      atorvastatin 10 MG tablet  Commonly known as: LIPITOR  What changed: Another medication with the same name was removed. Continue taking this medication, and follow the directions you see here.             CONTINUE taking these medications      aspirin 81 MG chewable tablet     eszopiclone 2 MG Tabs  Commonly known as: LUNESTA     fish oil 1000 MG capsule     hydroCHLOROthiazide 25 MG tablet  Commonly known as: HYDRODIURIL     latanoprost 0.005 % ophthalmic solution  Commonly known as: XALATAN     MULTI-VITAMIN DAILY PO     nitroGLYCERIN 0.4 MG SL tablet  Commonly known as: NITROSTAT     potassium citrate 10 MEQ (1080 MG) extended release tablet  Commonly known as: UROCIT-K               Where to Get Your Medications        These medications were sent to [id number] [place], [place] - [address] [facility] [phone] [address] [facility]      Phone: [phone]   celecoxib 100 MG capsule  docusate sodium 100 MG capsule  gabapentin 100 MG capsule  oxyCODONE 5 MG immediate release tablet         Follow-Up:  No future appointments.     He will follow-up with our office on Friday, 2/24/2022, for drain removal.          [facility]

## 2023-08-16 ENCOUNTER — OFFICE VISIT (OUTPATIENT)
Dept: UROLOGY | Age: 68
End: 2023-08-16

## 2023-08-16 DIAGNOSIS — N40.0 BENIGN PROSTATIC HYPERPLASIA WITHOUT LOWER URINARY TRACT SYMPTOMS: ICD-10-CM

## 2023-08-16 DIAGNOSIS — N20.0 KIDNEY STONE: Primary | ICD-10-CM

## 2023-08-16 LAB
BILIRUBIN, URINE, POC: NEGATIVE
BLOOD URINE, POC: NEGATIVE
GLUCOSE URINE, POC: NEGATIVE
KETONES, URINE, POC: NEGATIVE
LEUKOCYTE ESTERASE, URINE, POC: NEGATIVE
NITRITE, URINE, POC: NEGATIVE
PH, URINE, POC: 6 (ref 4.6–8)
PROTEIN,URINE, POC: NEGATIVE
PSA SERPL-MCNC: 0.6 NG/ML
SPECIFIC GRAVITY, URINE, POC: 1.02 (ref 1–1.03)
URINALYSIS CLARITY, POC: NORMAL
URINALYSIS COLOR, POC: NORMAL
UROBILINOGEN, POC: NORMAL

## 2023-08-16 ASSESSMENT — ENCOUNTER SYMPTOMS
NAUSEA: 0
BACK PAIN: 0

## 2023-08-16 NOTE — PROGRESS NOTES
Use    Vaping Use: Never used   Substance and Sexual Activity    Alcohol use: Yes     Alcohol/week: 2.5 standard drinks     Comment: 2-3 drinks/week    Drug use: No    Sexual activity: Not on file   Other Topics Concern    Not on file   Social History Narrative    Not on file     Social Determinants of Health     Financial Resource Strain: Not on file   Food Insecurity: Not on file   Transportation Needs: Not on file   Physical Activity: Not on file   Stress: Not on file   Social Connections: Not on file   Intimate Partner Violence: Not on file   Housing Stability: Not on file     Family History   Problem Relation Age of Onset    Cancer Father     Heart Disease Father        Review of Systems  Constitutional:   Negative for fever, chills, appetite change, malaise/fatigue, headaches and weight loss. Skin:  Negative for skin lesions, rash and itching. Eyes:  Negative for visual disturbance, eye pain and eye discharge. ENT:  Negative for difficulty articulating words, pain swallowing, high frequency hearing loss and dry mouth. Respiratory:  Negative for cough, blood in sputum, shortness of breath and wheezing. Cardiovascular:  Negative for chest pain, hypertension, irregular heartbeat, leg pain, leg swelling, regular rate and rhythm and varicose veins. GI:  Negative for nausea, vomiting, abdominal pain, blood in stool, constipation, diarrhea, indigestion and heartburn. Genitourinary: Positive for history of urolithiasis. Negative for urinary burning, hematuria, flank pain, recurrent UTIs, nocturia, slower stream, straining, urgency, leakage w/ urge, frequent urination, incomplete emptying, erectile dysfunction, testicular pain, sexually transmitted disease, discharge and urethral stricture. Musculoskeletal:  Negative for back pain, bone pain, arthralgias, tenderness, muscle weakness and neck pain. Neurological:  Negative for dizziness, focal weakness, numbness, seizures and tremors.   Psychological:  Negative

## 2023-08-18 ASSESSMENT — ENCOUNTER SYMPTOMS
CONSTIPATION: 0
HEARTBURN: 0
SHORTNESS OF BREATH: 0
INDIGESTION: 0
BLOOD IN STOOL: 0
EYE PAIN: 0
WHEEZING: 0
VOMITING: 0
DIARRHEA: 0
SKIN LESIONS: 0
ABDOMINAL PAIN: 0
EYE DISCHARGE: 0
COUGH: 0

## 2023-08-18 NOTE — RESULT ENCOUNTER NOTE
Mr. Shirlene Wills, your PSA is normal at 0.6. This is great news! Please keep your follow up with me as scheduled.      Dr. Jorge L Caldwell

## 2024-02-18 NOTE — PROGRESS NOTES
[facility]  [address]   [address]  [place] [address]  [phone]    Camilo Gusman  : 1955    CC: Kidney stone follow up    HPI     Camilo Gusman is a 68 y.o. male with a PMH of Kidney Stones who returns for follow up today.     Seen 2023 and previously followed by Dr. Garibay.  At that time, he reported passing multiple stones spontaneously over the past year but has not required surgery to remove stones in at least 5 years.  He was on HCTZ and Urocit K but has been off of them X 6 months and is doing well.  Last 24 hour urine 15 years ago.     This year, he reports no flank pain and no bothersome LUTS.       Of note, he last visit he reported post-void dribbling but no retention, UTIs, hematuria, urinary incontinence or other symptoms of concern.  He is not on any prostate/bladder medications.  PSA .6 2023.     KUB today shows that 5 mm L kidney stone has increased to 6 mm and possibly moved to L proximal ureter out of kidney.  Non-obstructing 2 mm stone also in L lower pole of kidney and R kidney as well.            Past Medical History:   Diagnosis Date    Basal cell cancer 2012    skin CA removed    CAD (coronary artery disease)     nonobstructive    Cancer (HCC)     basal and squamous cell    Coagulation defects     slight easy to bleed d/t asa 81    Dyslipidemia     History of kidney stones x3 episodes    has one presently    Hyperlipidemia     Obesity (BMI 30-39.9) 30.7    Other ill-defined conditions(799.89)     kidney stones x6 passed 1 on own    Posterior vitreous detachment of both eyes     Unspecified sleep apnea     cpap     Past Surgical History:   Procedure Laterality Date    CATARACT EXTRACTION      COLONOSCOPY  2004    NECK SURGERY Left 2023    PANENDOSCOPY, TONSILLECTOMY, AND LEFT NECK DISSECTION performed by Geoffrey Tidwell MD at [facility] MAIN OR    ORTHOPEDIC SURGERY Left 12    left rotator cuff    ROTATOR CUFF REPAIR Right     SKIN

## 2024-02-19 ENCOUNTER — OFFICE VISIT (OUTPATIENT)
Dept: UROLOGY | Age: 69
End: 2024-02-19
Payer: MEDICARE

## 2024-02-19 DIAGNOSIS — N40.0 BENIGN PROSTATIC HYPERPLASIA WITHOUT LOWER URINARY TRACT SYMPTOMS: ICD-10-CM

## 2024-02-19 DIAGNOSIS — N20.0 KIDNEY STONE: Primary | ICD-10-CM

## 2024-02-19 LAB
BILIRUBIN, URINE, POC: NEGATIVE
BLOOD URINE, POC: NORMAL
GLUCOSE URINE, POC: NEGATIVE
KETONES, URINE, POC: NEGATIVE
LEUKOCYTE ESTERASE, URINE, POC: NEGATIVE
NITRITE, URINE, POC: NEGATIVE
PH, URINE, POC: 6 (ref 4.6–8)
PROTEIN,URINE, POC: 30
SPECIFIC GRAVITY, URINE, POC: 1.03 (ref 1–1.03)
URINALYSIS CLARITY, POC: NORMAL
URINALYSIS COLOR, POC: NORMAL
UROBILINOGEN, POC: NORMAL

## 2024-02-19 PROCEDURE — G8419 CALC BMI OUT NRM PARAM NOF/U: HCPCS | Performed by: UROLOGY

## 2024-02-19 PROCEDURE — 1123F ACP DISCUSS/DSCN MKR DOCD: CPT | Performed by: UROLOGY

## 2024-02-19 PROCEDURE — G8484 FLU IMMUNIZE NO ADMIN: HCPCS | Performed by: UROLOGY

## 2024-02-19 PROCEDURE — 81003 URINALYSIS AUTO W/O SCOPE: CPT | Performed by: UROLOGY

## 2024-02-19 PROCEDURE — 3017F COLORECTAL CA SCREEN DOC REV: CPT | Performed by: UROLOGY

## 2024-02-19 PROCEDURE — 74018 RADEX ABDOMEN 1 VIEW: CPT | Performed by: UROLOGY

## 2024-02-19 PROCEDURE — 99214 OFFICE O/P EST MOD 30 MIN: CPT | Performed by: UROLOGY

## 2024-02-19 PROCEDURE — G8427 DOCREV CUR MEDS BY ELIG CLIN: HCPCS | Performed by: UROLOGY

## 2024-02-19 PROCEDURE — 1036F TOBACCO NON-USER: CPT | Performed by: UROLOGY

## 2024-02-20 ASSESSMENT — ENCOUNTER SYMPTOMS
SHORTNESS OF BREATH: 0
BLOOD IN STOOL: 0
BACK PAIN: 0
ABDOMINAL PAIN: 0
NAUSEA: 0
INDIGESTION: 0
CONSTIPATION: 0
WHEEZING: 0
SKIN LESIONS: 0
COUGH: 0
EYE PAIN: 0
DIARRHEA: 0
VOMITING: 0
HEARTBURN: 0
EYE DISCHARGE: 0

## 2024-08-21 ENCOUNTER — LAB (OUTPATIENT)
Dept: UROLOGY | Age: 69
End: 2024-08-21

## 2024-08-21 DIAGNOSIS — N40.0 BENIGN PROSTATIC HYPERPLASIA WITHOUT LOWER URINARY TRACT SYMPTOMS: Primary | ICD-10-CM

## 2024-08-21 LAB — PSA SERPL-MCNC: 0.7 NG/ML (ref 0–4)

## 2024-08-27 NOTE — PROGRESS NOTES
[facility]  [address]   [address]  [place] [address]  [phone]    Camilo Gusman  : 1955    CC: Hx of Renal Calculi and BPH         HPI     Camilo Gusman is a 69 y.o. male with PMHx of kidney stones and BPH returns today for 6 month follow up.    Last seen 2024 and doing well at that time.  He was on HCTZ and K Citrate in the past for kidney stone prevention but has been off of these for quite some time now.      Today, he reports having lower back surgery coming up in 2024.  No issues with stones.  No flank pian / hematuria    KUB today shows stable two 2 mm R kidney stones and a 5 mm L stable lower pole kidney stone    Lab Results   Component Value Date    PSA 0.7 2024    PSA 0.6 2023       Past Medical History:   Diagnosis Date    Basal cell cancer 2012    skin CA removed    CAD (coronary artery disease)     nonobstructive    Cancer (HCC)     basal and squamous cell    Coagulation defects     slight easy to bleed d/t asa 81    Dyslipidemia     History of kidney stones x3 episodes    has one presently    Hyperlipidemia     Obesity (BMI 30-39.9) 30.7    Other ill-defined conditions(799.89)     kidney stones x6 passed 1 on own    Posterior vitreous detachment of both eyes     Unspecified sleep apnea     cpap     Past Surgical History:   Procedure Laterality Date    CATARACT EXTRACTION      COLONOSCOPY  2004    NECK SURGERY Left 2023    PANENDOSCOPY, TONSILLECTOMY, AND LEFT NECK DISSECTION performed by Geoffrey Tidwell MD at [facility] MAIN OR    ORTHOPEDIC SURGERY Left 12    left rotator cuff    ROTATOR CUFF REPAIR Right     SKIN BIOPSY  2015    UROLOGICAL SURGERY      litho; cysto, ,, ,      Current Outpatient Medications   Medication Sig Dispense Refill    atorvastatin (LIPITOR) 10 MG tablet Take 1 tablet by mouth at bedtime      Omega-3 Fatty Acids (FISH OIL) 1000 MG capsule Take by mouth 2 times daily      Multiple Vitamin

## 2024-08-28 ENCOUNTER — OFFICE VISIT (OUTPATIENT)
Dept: UROLOGY | Age: 69
End: 2024-08-28
Payer: MEDICARE

## 2024-08-28 DIAGNOSIS — N40.0 BENIGN PROSTATIC HYPERPLASIA WITHOUT LOWER URINARY TRACT SYMPTOMS: ICD-10-CM

## 2024-08-28 DIAGNOSIS — N20.0 KIDNEY STONE: Primary | ICD-10-CM

## 2024-08-28 LAB
BILIRUBIN, URINE, POC: NEGATIVE
BLOOD URINE, POC: NEGATIVE
GLUCOSE URINE, POC: NEGATIVE
KETONES, URINE, POC: NEGATIVE
LEUKOCYTE ESTERASE, URINE, POC: NEGATIVE
NITRITE, URINE, POC: NEGATIVE
PH, URINE, POC: 6 (ref 4.6–8)
PROTEIN,URINE, POC: NEGATIVE
SPECIFIC GRAVITY, URINE, POC: 1.02 (ref 1–1.03)
URINALYSIS CLARITY, POC: NORMAL
URINALYSIS COLOR, POC: NORMAL
UROBILINOGEN, POC: NORMAL

## 2024-08-28 PROCEDURE — 3017F COLORECTAL CA SCREEN DOC REV: CPT | Performed by: UROLOGY

## 2024-08-28 PROCEDURE — 74018 RADEX ABDOMEN 1 VIEW: CPT | Performed by: UROLOGY

## 2024-08-28 PROCEDURE — 1123F ACP DISCUSS/DSCN MKR DOCD: CPT | Performed by: UROLOGY

## 2024-08-28 PROCEDURE — 81003 URINALYSIS AUTO W/O SCOPE: CPT | Performed by: UROLOGY

## 2024-08-28 PROCEDURE — G8427 DOCREV CUR MEDS BY ELIG CLIN: HCPCS | Performed by: UROLOGY

## 2024-08-28 PROCEDURE — 99213 OFFICE O/P EST LOW 20 MIN: CPT | Performed by: UROLOGY

## 2024-08-28 PROCEDURE — G8421 BMI NOT CALCULATED: HCPCS | Performed by: UROLOGY

## 2024-08-28 PROCEDURE — 1036F TOBACCO NON-USER: CPT | Performed by: UROLOGY

## 2024-08-28 ASSESSMENT — ENCOUNTER SYMPTOMS
HEARTBURN: 0
EYE PAIN: 0
CONSTIPATION: 0
ABDOMINAL PAIN: 0
INDIGESTION: 0
BLOOD IN STOOL: 0
WHEEZING: 0
BACK PAIN: 0
VOMITING: 0
COUGH: 0
SHORTNESS OF BREATH: 0
SKIN LESIONS: 0
EYE DISCHARGE: 0
NAUSEA: 0
DIARRHEA: 0

## 2025-08-27 ENCOUNTER — OFFICE VISIT (OUTPATIENT)
Dept: UROLOGY | Age: 70
End: 2025-08-27
Payer: MEDICARE

## 2025-08-27 DIAGNOSIS — N20.0 KIDNEY STONE: Primary | ICD-10-CM

## 2025-08-27 DIAGNOSIS — N40.0 BENIGN PROSTATIC HYPERPLASIA WITHOUT LOWER URINARY TRACT SYMPTOMS: ICD-10-CM

## 2025-08-27 DIAGNOSIS — N52.01 ERECTILE DYSFUNCTION DUE TO ARTERIAL INSUFFICIENCY: ICD-10-CM

## 2025-08-27 LAB
BILIRUBIN, URINE, POC: NEGATIVE
BLOOD URINE, POC: NEGATIVE
GLUCOSE URINE, POC: NEGATIVE MG/DL
KETONES, URINE, POC: NEGATIVE MG/DL
LEUKOCYTE ESTERASE, URINE, POC: NEGATIVE
NITRITE, URINE, POC: NEGATIVE
PH, URINE, POC: 6 (ref 4.6–8)
PROTEIN,URINE, POC: NEGATIVE MG/DL
PSA SERPL-MCNC: 0.7 NG/ML (ref 0–4)
SPECIFIC GRAVITY, URINE, POC: 1.03 (ref 1–1.03)
URINALYSIS CLARITY, POC: NORMAL
URINALYSIS COLOR, POC: NORMAL
UROBILINOGEN, POC: NORMAL MG/DL

## 2025-08-27 PROCEDURE — 81003 URINALYSIS AUTO W/O SCOPE: CPT | Performed by: UROLOGY

## 2025-08-27 PROCEDURE — 1159F MED LIST DOCD IN RCRD: CPT | Performed by: UROLOGY

## 2025-08-27 PROCEDURE — 74018 RADEX ABDOMEN 1 VIEW: CPT | Performed by: UROLOGY

## 2025-08-27 PROCEDURE — 1123F ACP DISCUSS/DSCN MKR DOCD: CPT | Performed by: UROLOGY

## 2025-08-27 PROCEDURE — G8421 BMI NOT CALCULATED: HCPCS | Performed by: UROLOGY

## 2025-08-27 PROCEDURE — 3017F COLORECTAL CA SCREEN DOC REV: CPT | Performed by: UROLOGY

## 2025-08-27 PROCEDURE — G8427 DOCREV CUR MEDS BY ELIG CLIN: HCPCS | Performed by: UROLOGY

## 2025-08-27 PROCEDURE — 99214 OFFICE O/P EST MOD 30 MIN: CPT | Performed by: UROLOGY

## 2025-08-27 PROCEDURE — 1036F TOBACCO NON-USER: CPT | Performed by: UROLOGY

## 2025-08-27 RX ORDER — METOPROLOL SUCCINATE 25 MG/1
25 TABLET, EXTENDED RELEASE ORAL DAILY
COMMUNITY
Start: 2025-04-24

## 2025-08-27 RX ORDER — CETIRIZINE HYDROCHLORIDE 10 MG/1
10 TABLET ORAL DAILY
COMMUNITY

## 2025-08-27 RX ORDER — APIXABAN 5 MG/1
5 TABLET, FILM COATED ORAL
COMMUNITY
Start: 2024-11-17

## 2025-08-27 RX ORDER — TADALAFIL 20 MG/1
20 TABLET ORAL DAILY PRN
Qty: 10 TABLET | Refills: 5 | Status: SHIPPED | OUTPATIENT
Start: 2025-08-27

## 2025-08-28 ASSESSMENT — ENCOUNTER SYMPTOMS
HEARTBURN: 0
VOMITING: 0
EYE PAIN: 0
BACK PAIN: 0
BLOOD IN STOOL: 0
SHORTNESS OF BREATH: 0
DIARRHEA: 0
SKIN LESIONS: 0
CONSTIPATION: 0
ABDOMINAL PAIN: 0
COUGH: 0
NAUSEA: 0
WHEEZING: 0
INDIGESTION: 0
EYE DISCHARGE: 0

## (undated) DEVICE — BLADE ES ELASTOMERIC COAT INSUL DURABLE BEND UPTO 90DEG

## (undated) DEVICE — RESERVOIR,SUCTION,100CC,SILICONE: Brand: MEDLINE

## (undated) DEVICE — DRAIN SURG 15FR SIL RND CHN W/ TRCR FULL FLUT DBL WRP TRAD

## (undated) DEVICE — AGENT HEMSTAT W2XL14IN OXIDIZED REGENERATED CELOS ABSRB FOR

## (undated) DEVICE — CANISTER, RIGID, 2000CC: Brand: MEDLINE INDUSTRIES, INC.

## (undated) DEVICE — APPLIER CLP LIG SM TI PREM SURGCLP SUPER INTLOK 20 DISP

## (undated) DEVICE — TOWEL,OR,DSP,ST,BLUE,STD,4/PK,20PK/CS: Brand: MEDLINE

## (undated) DEVICE — SUTURE NONABSORBABLE MONOFILAMENT 4-0 RB-1 36 IN BLU PROLENE 8557H

## (undated) DEVICE — SUTURE ABSRB 27IN SZ 4-0 17MM RB-1 1/2 CIR TAPR PNT MFIL U207H

## (undated) DEVICE — DRAPE,INSTRUMENT,MAGNETIC,10X16: Brand: MEDLINE

## (undated) DEVICE — GARMENT,MEDLINE,DVT,INT,CALF,MED, GEN2: Brand: MEDLINE

## (undated) DEVICE — PENCIL ES L3M BTTN SWCH HOLSTER W/ BLDE ELECTRD EDGE

## (undated) DEVICE — MASTISOL ADHESIVE LIQ 2/3ML

## (undated) DEVICE — NEPTUNE E-SEP SMOKE EVACUATION PENCIL, COATED, 70MM BLADE, PUSH BUTTON SWITCH: Brand: NEPTUNE E-SEP

## (undated) DEVICE — SEALER LAP SM L18.8CM OPN JAW HAND/FOOT SWCH FORCETRIAD

## (undated) DEVICE — SUTURE PERMA-HAND SZ 2-0 L30IN NONABSORBABLE BLK L26MM SH K833H

## (undated) DEVICE — PREMIUM WET SKIN PREP TRAY: Brand: MEDLINE INDUSTRIES, INC.

## (undated) DEVICE — JELLY LUBRICATING 10GM PREFIL SYR LUBE

## (undated) DEVICE — HOOK RETRCT 12MM S STL BLNT E STAY LONE STAR

## (undated) DEVICE — CLIP LIG M BLU TI HRT SHP WIRE HORZ 6 CLIPS PER PK

## (undated) DEVICE — INTENDED TO BE USED TO OCCLUDE, RETRACT AND IDENTIFY ARTERIES, VEINS, TENDONS AND NERVES IN SURGICAL PROCEDURES: Brand: STERION®  VESSEL LOOP

## (undated) DEVICE — SUTURE PERMAHAND SZ 2-0 L30IN NONABSORBABLE BLK SH L26MM C016D

## (undated) DEVICE — PAD,NON-ADHERENT,3X8,STERILE,LF,1/PK: Brand: MEDLINE

## (undated) DEVICE — TOTAL TRAY, DB, 100% SILI FOLEY, 16FR 10: Brand: MEDLINE

## (undated) DEVICE — STAPLER, SKIN, 35W, A: Brand: MEDLINE INDUSTRIES, INC.

## (undated) DEVICE — STRIP,CLOSURE,WOUND,MEDI-STRIP,1/2X4: Brand: MEDLINE

## (undated) DEVICE — DRAPE TWL SURG 16X26IN BLU ORB06] ALLCARE INC]

## (undated) DEVICE — 3M™ TEGADERM™ TRANSPARENT FILM DRESSING FRAME STYLE, 1628, 6 IN X 8 IN (15 CM X 20 CM), 10/CT 8CT/CASE: Brand: 3M™ TEGADERM™

## (undated) DEVICE — SPONGE LAPAROTOMY W18XL18IN WHITE STRUNG RADIOPAQUE STERILE

## (undated) DEVICE — GLOVE ORANGE PI 7 1/2   MSG9075

## (undated) DEVICE — KIT,ANTI FOG,W/SPONGE & FLUID,SOFT PACK: Brand: MEDLINE

## (undated) DEVICE — SPONGE: SPECIALTY PEANUT XR 100/CS: Brand: MEDICAL ACTION INDUSTRIES

## (undated) DEVICE — SPONGE: SPECIALTY TONSIL XR MED 100/CS: Brand: MEDICAL ACTION INDUSTRIES

## (undated) DEVICE — SOLUTION IRRIG 1000ML STRL H2O USP PLAS POUR BTL

## (undated) DEVICE — SPONGE GZ W6XL6IN COT 6 PLY SUP FLUF EXTRA ABSRB FOR PRE OP

## (undated) DEVICE — GLOVE SURG SZ 8 L12IN FNGR THK79MIL GRN LTX FREE

## (undated) DEVICE — YANKAUER,BULB TIP,W/O VENT,RIGID,STERILE: Brand: MEDLINE

## (undated) DEVICE — ELECTRODE NDL L6.5IN S STL VERSATILE REUSE

## (undated) DEVICE — SOLUTION IRRIG 1000ML 0.9% SOD CHL USP POUR PLAS BTL

## (undated) DEVICE — KIT PROCEDURE SURG HEAD AND NECK TOTE

## (undated) DEVICE — CORD ES L12FT BPLR FRCP

## (undated) DEVICE — SPONGE,NEURO,1"X3",XR,STRL,LF,10/PK: Brand: MEDLINE

## (undated) DEVICE — SUTURE VCRL SZ 3-0 L18IN ABSRB UD L26MM SH 1/2 CIR J864D